# Patient Record
Sex: FEMALE | NOT HISPANIC OR LATINO | URBAN - METROPOLITAN AREA
[De-identification: names, ages, dates, MRNs, and addresses within clinical notes are randomized per-mention and may not be internally consistent; named-entity substitution may affect disease eponyms.]

---

## 2021-11-01 ENCOUNTER — APPOINTMENT (RX ONLY)
Dept: URBAN - METROPOLITAN AREA CLINIC 349 | Facility: CLINIC | Age: 36
Setting detail: DERMATOLOGY
End: 2021-11-01

## 2021-11-01 DIAGNOSIS — L30.1 DYSHIDROSIS [POMPHOLYX]: ICD-10-CM

## 2021-11-01 PROCEDURE — ? COUNSELING

## 2021-11-01 PROCEDURE — ? OTHER

## 2021-11-01 PROCEDURE — ? PRESCRIPTION

## 2021-11-01 PROCEDURE — 96372 THER/PROPH/DIAG INJ SC/IM: CPT

## 2021-11-01 PROCEDURE — ? PRESCRIPTION MEDICATION MANAGEMENT

## 2021-11-01 PROCEDURE — ? INTRAMUSCULAR KENALOG

## 2021-11-01 RX ORDER — TACROLIMUS 1 MG/G
OINTMENT TOPICAL BID
Qty: 30 | Refills: 3 | Status: ERX | COMMUNITY
Start: 2021-11-01

## 2021-11-01 RX ORDER — DUPILUMAB 300 MG/2ML
INJECTION, SOLUTION SUBCUTANEOUS
Qty: 1 | Refills: 3 | Status: ERX | COMMUNITY
Start: 2021-11-01

## 2021-11-01 RX ORDER — CEPHALEXIN 500 MG/1
TABLET ORAL
Qty: 14 | Refills: 0 | Status: ERX | COMMUNITY
Start: 2021-11-01

## 2021-11-01 RX ADMIN — CEPHALEXIN: 500 TABLET ORAL at 00:00

## 2021-11-01 RX ADMIN — TACROLIMUS: 1 OINTMENT TOPICAL at 00:00

## 2021-11-01 RX ADMIN — DUPILUMAB: 300 INJECTION, SOLUTION SUBCUTANEOUS at 00:00

## 2021-11-01 ASSESSMENT — LOCATION DETAILED DESCRIPTION DERM
LOCATION DETAILED: RIGHT RADIAL DORSAL HAND
LOCATION DETAILED: LEFT DORSAL MIDDLE METACARPOPHALANGEAL JOINT
LOCATION DETAILED: RIGHT BUTTOCK
LOCATION DETAILED: RIGHT DORSAL MIDDLE FINGER METACARPOPHALANGEAL JOINT
LOCATION DETAILED: 2ND WEB SPACE LEFT HAND

## 2021-11-01 ASSESSMENT — LOCATION SIMPLE DESCRIPTION DERM
LOCATION SIMPLE: RIGHT HAND
LOCATION SIMPLE: RIGHT BUTTOCK
LOCATION SIMPLE: LEFT HAND

## 2021-11-01 ASSESSMENT — LOCATION ZONE DERM
LOCATION ZONE: HAND
LOCATION ZONE: TRUNK

## 2021-11-01 NOTE — PROCEDURE: INTRAMUSCULAR KENALOG
Kenalog Preparation: kenalog
Add Option For Additional Mediation: No
Consent: The risks of atrophy were reviewed with the patient.
Concentration (Mg/Ml) Of Additional Medication: 2.5
Administered By (Optional): Margarette Celeste LPN
Concentration (Mg/Ml): 40.0
Total Volume (Ccs): 2
Detail Level: Detailed
0 = independent

## 2021-11-01 NOTE — PROCEDURE: OTHER
Detail Level: Zone
Render Risk Assessment In Note?: yes
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
Note Text (......Xxx Chief Complaint.): This diagnosis correlates with the

## 2021-11-01 NOTE — PROCEDURE: PRESCRIPTION MEDICATION MANAGEMENT
Initiate Treatment: Protopic
Plan: Will,try and get Dupixent
Detail Level: Zone
Continue Regimen: Triamcinolone
Render In Strict Bullet Format?: No

## 2021-11-08 ENCOUNTER — RX ONLY (OUTPATIENT)
Age: 36
Setting detail: RX ONLY
End: 2021-11-08

## 2021-11-08 RX ORDER — DUPILUMAB 300 MG/2ML
INJECTION, SOLUTION SUBCUTANEOUS
Qty: 1 | Refills: 3 | Status: ERX

## 2022-01-24 ENCOUNTER — APPOINTMENT (RX ONLY)
Dept: URBAN - METROPOLITAN AREA CLINIC 349 | Facility: CLINIC | Age: 37
Setting detail: DERMATOLOGY
End: 2022-01-24

## 2022-01-24 DIAGNOSIS — L30.1 DYSHIDROSIS [POMPHOLYX]: ICD-10-CM | Status: IMPROVED

## 2022-01-24 PROCEDURE — ? COUNSELING

## 2022-01-24 PROCEDURE — ? PRESCRIPTION

## 2022-01-24 PROCEDURE — 99214 OFFICE O/P EST MOD 30 MIN: CPT

## 2022-01-24 PROCEDURE — ? OTHER

## 2022-01-24 PROCEDURE — ? PRESCRIPTION MEDICATION MANAGEMENT

## 2022-01-24 RX ORDER — DUPILUMAB 300 MG/2ML
INJECTION, SOLUTION SUBCUTANEOUS
Qty: 2 | Refills: 3 | Status: ERX

## 2022-01-24 ASSESSMENT — LOCATION DETAILED DESCRIPTION DERM
LOCATION DETAILED: 2ND WEB SPACE LEFT HAND
LOCATION DETAILED: RIGHT DORSAL MIDDLE FINGER METACARPOPHALANGEAL JOINT
LOCATION DETAILED: LEFT DORSAL MIDDLE METACARPOPHALANGEAL JOINT
LOCATION DETAILED: RIGHT RADIAL DORSAL HAND

## 2022-01-24 ASSESSMENT — LOCATION ZONE DERM: LOCATION ZONE: HAND

## 2022-01-24 ASSESSMENT — LOCATION SIMPLE DESCRIPTION DERM
LOCATION SIMPLE: RIGHT HAND
LOCATION SIMPLE: LEFT HAND

## 2022-01-24 NOTE — PROCEDURE: PRESCRIPTION MEDICATION MANAGEMENT
Render In Strict Bullet Format?: No
Continue Regimen: Triamcinolone or pro topic prn\\nDupixent
Samples Given: Dupixent
Detail Level: Zone

## 2022-01-24 NOTE — PROCEDURE: OTHER
Render Risk Assessment In Note?: yes
Other (Free Text): IGA 1
Detail Level: Zone
Note Text (......Xxx Chief Complaint.): This diagnosis correlates with the

## 2022-02-10 RX ORDER — DUPILUMAB 300 MG/2ML
INJECTION, SOLUTION SUBCUTANEOUS
Qty: 2 | Refills: 3 | Status: ERX

## 2022-03-03 ENCOUNTER — APPOINTMENT (RX ONLY)
Dept: URBAN - METROPOLITAN AREA CLINIC 329 | Facility: CLINIC | Age: 37
Setting detail: DERMATOLOGY
End: 2022-03-03

## 2022-03-03 DIAGNOSIS — L30.1 DYSHIDROSIS [POMPHOLYX]: ICD-10-CM | Status: INADEQUATELY CONTROLLED

## 2022-03-03 DIAGNOSIS — L20.89 OTHER ATOPIC DERMATITIS: ICD-10-CM | Status: INADEQUATELY CONTROLLED

## 2022-03-03 PROCEDURE — ? ADDITIONAL NOTES

## 2022-03-03 PROCEDURE — ? OTHER

## 2022-03-03 PROCEDURE — ? PRESCRIPTION MEDICATION MANAGEMENT

## 2022-03-03 PROCEDURE — 99214 OFFICE O/P EST MOD 30 MIN: CPT

## 2022-03-03 PROCEDURE — ? PRESCRIPTION

## 2022-03-03 PROCEDURE — ? COUNSELING

## 2022-03-03 RX ORDER — RUXOLITINIB 15 MG/G
CREAM TOPICAL
Qty: 60 | Refills: 3 | Status: ERX | COMMUNITY
Start: 2022-03-03

## 2022-03-03 RX ORDER — DUPILUMAB 300 MG/2ML
INJECTION, SOLUTION SUBCUTANEOUS
Qty: 2 | Refills: 5 | Status: ERX | COMMUNITY
Start: 2022-03-03

## 2022-03-03 RX ADMIN — RUXOLITINIB: 15 CREAM TOPICAL at 00:00

## 2022-03-03 RX ADMIN — DUPILUMAB: 300 INJECTION, SOLUTION SUBCUTANEOUS at 00:00

## 2022-03-03 ASSESSMENT — LOCATION DETAILED DESCRIPTION DERM
LOCATION DETAILED: RIGHT RADIAL DORSAL HAND
LOCATION DETAILED: RIGHT DORSAL MIDDLE FINGER METACARPOPHALANGEAL JOINT
LOCATION DETAILED: RIGHT DORSAL MIDDLE FINGER METACARPOPHALANGEAL JOINT
LOCATION DETAILED: LEFT DORSAL MIDDLE METACARPOPHALANGEAL JOINT
LOCATION DETAILED: 2ND WEB SPACE LEFT HAND

## 2022-03-03 ASSESSMENT — BSA ECZEMA: % BODY COVERED IN ECZEMA: 16

## 2022-03-03 ASSESSMENT — ITCH NUMERIC RATING SCALE: HOW SEVERE IS YOUR ITCHING?: 2

## 2022-03-03 ASSESSMENT — LOCATION SIMPLE DESCRIPTION DERM
LOCATION SIMPLE: RIGHT HAND
LOCATION SIMPLE: RIGHT HAND
LOCATION SIMPLE: LEFT HAND

## 2022-03-03 ASSESSMENT — LOCATION ZONE DERM
LOCATION ZONE: HAND
LOCATION ZONE: HAND

## 2022-03-03 NOTE — PROCEDURE: PRESCRIPTION MEDICATION MANAGEMENT
Render In Strict Bullet Format?: No
Continue Regimen: Triamcinolone or pro topic prn
Detail Level: Zone
Detail Level: Detailed
Initiate Treatment: Pt is being treated with Opzelura to helps w/ flare ups while on dupixent

## 2022-03-16 ENCOUNTER — RX ONLY (OUTPATIENT)
Age: 37
Setting detail: RX ONLY
End: 2022-03-16

## 2022-03-16 RX ORDER — DUPILUMAB 300 MG/2ML
INJECTION, SOLUTION SUBCUTANEOUS
Qty: 2 | Refills: 3 | Status: ERX

## 2022-03-16 RX ORDER — DUPILUMAB 300 MG/2ML
INJECTION, SOLUTION SUBCUTANEOUS
Qty: 2 | Refills: 5 | Status: ERX

## 2022-03-19 PROBLEM — E66.01 SEVERE OBESITY (HCC): Status: ACTIVE | Noted: 2020-11-24

## 2022-06-09 ENCOUNTER — TELEPHONE (OUTPATIENT)
Dept: OBGYN CLINIC | Age: 37
End: 2022-06-09

## 2022-06-09 RX ORDER — METRONIDAZOLE 7.5 MG/G
1 GEL VAGINAL NIGHTLY
Qty: 5 EACH | Refills: 0 | Status: SHIPPED | OUTPATIENT
Start: 2022-06-09 | End: 2022-06-14

## 2022-06-09 NOTE — TELEPHONE ENCOUNTER
Pt called regarding Nuswab results from 5/18/22. Pt states on voicemail that this is her third time calling this week for results but has not received a call back. No prior documentation of any calls. Results have not yet been reviewed by . Results reviewed by Nery Guerrero as Dayami Jacobs is out of the office until next week. Per  to send in Metrogel x 5 nights. Attempted to return call x 2. No answer. LM instructing pt to return call. Will send rx once pt is notified of results and verify pharmacy.

## 2022-06-09 NOTE — TELEPHONE ENCOUNTER
Pt returned call. Results and recommendations reviewed. Pt voiced understanding. Rx sent to verified pharmacy.

## 2022-06-20 ENCOUNTER — HOSPITAL ENCOUNTER (OUTPATIENT)
Dept: LAB | Age: 37
Discharge: HOME | End: 2022-06-23
Payer: OTHER GOVERNMENT

## 2022-06-20 ENCOUNTER — OFFICE VISIT (OUTPATIENT)
Dept: OBGYN CLINIC | Age: 37
End: 2022-06-20
Payer: OTHER GOVERNMENT

## 2022-06-20 VITALS — WEIGHT: 204 LBS | BODY MASS INDEX: 34.83 KG/M2 | HEIGHT: 64 IN

## 2022-06-20 DIAGNOSIS — N93.0 POSTCOITAL BLEEDING: ICD-10-CM

## 2022-06-20 DIAGNOSIS — N93.8 DUB (DYSFUNCTIONAL UTERINE BLEEDING): Primary | ICD-10-CM

## 2022-06-20 DIAGNOSIS — N84.1 ENDOCERVICAL POLYP: ICD-10-CM

## 2022-06-20 PROCEDURE — 57500 BIOPSY OF CERVIX: CPT | Performed by: OBSTETRICS & GYNECOLOGY

## 2022-06-20 PROCEDURE — 99204 OFFICE O/P NEW MOD 45 MIN: CPT | Performed by: OBSTETRICS & GYNECOLOGY

## 2022-06-20 PROCEDURE — 88305 TISSUE EXAM BY PATHOLOGIST: CPT

## 2022-06-20 NOTE — PROGRESS NOTES
Everett Cassidy  is a 29 y.o. female, No obstetric history on file., Patient's last menstrual period was 11/10/2021 (approximate). , new patient who is seen for a second opinion about her abnormal uterine bleeding. Would like to discuss proceeding with hysterectomy. November irregular bleeding started and continued up until the end of April; beginning of May. In between this she consulted with OB/GYN in January about this irregular bleeding. She was prescribed OCP's to help control bleeding. She took the OCP's X4 months however while on the pills her irregular bleeding pattern continued. She then self discontinued the pills. Now she is just having irregular spotting and discharge that she describes as a [place] brown/ yellow\" color with a foul odor. Denies vaginal itching. Prior to the irregular bleeding that started in November she states that her periods were overall normal --- the beginning of each month, lasting 5-7 days, first few days heavy bleeding then light bleeding. Denies dysmenorrhea. Denies post coital bleeding. No known history of uterine fibroids, PCOS, ovarian cyst or endometriosis. Last pap smear was performed on 4/28/21: ASCUS, HPV Negative. Endometrial Biopsy from 1/20/22 was benign. Labs drawn on 1/20/22: [address] (6.7), Estradiol (36.2), Luteinizing hormones (5.7), CBC (wnl), TSH (2.090), T4 (1.13)  Hemoglobin on 5/18/22 (10.6)  Nuswab on 5/18/22 --- BV --- Treated with Metrogel. Prior to November very regular  On pills constant spotting  Also now post coital.    Gyn ultrasound ordered on 2/3/22 by Lakia Solano D:  · Enlarged, heterogeneous, anteverted uterus. · Endometrium= 6.97 mm  · ROV is enlarged, contains one simple cyst 2.9 x 2.2 x 2.2 cm and one complex cyst with thin septation. · LOV visualized TA only due to bowel gas, appears within normal limits. · Trace amount of free fluid in the posterior CDS. Just medial to right ovary.       HISTORY:  Sexual History:  has sex with males  Contraception:  Tubal ligation   Current Outpatient Medications on File Prior to Visit   Medication Sig Dispense Refill    traZODone (DESYREL) 100 MG tablet Take 200 mg by mouth       No current facility-administered medications on file prior to visit. ROS:  @MARLYN Woodruff Ashley  has a past medical history of Contact dermatitis and eczema due to cause and Depression. .    Previous surgeries include  has a past surgical history that includes lap,tubal cautery; heent; gyn; and Cholecystectomy. .    Her current meds are   Current Outpatient Medications:     traZODone (DESYREL) 100 MG tablet, Take 200 mg by mouth, Disp: , Rfl:      Family history is significant for family history includes Diabetes in her maternal grandmother; Endometriosis in her maternal aunt and maternal grandmother; Hypertension in her father and maternal grandmother; No Known Problems in her mother. .       PHYSICAL EXAM:  Height 5' 4\" (1.626 m), weight 204 lb (92.5 kg), last menstrual period 11/10/2021, not currently breastfeeding. On speculum exam she is noted to have a large approximately 2 cm endocervical polyp coming through the cervix. On bimanual exam the uterus is normal shape and contour and freely mobile. ASSESSMENT:  Encounter Diagnoses   Name Primary?  Endocervical polyp     DUB (dysfunctional uterine bleeding) Yes    Postcoital bleeding        PLAN:  She is having continued episodes of abnormal bleeding and postcoital bleeding which significantly interfering with her normal activities. This is significantly interfering with her life. She very much wishes more definitive therapy. Discussed possibility of further medical therapy possible IUD possible endometrial ablation or proceeding on with hysterectomy she very much wished to proceed on with hysterectomy.   Discussed laparoscopic hysterectomy robotic versus traditional.  We will plan on leaving her ovaries so she would not have changes hormonally and she wishes to proceed on with this route. Did discuss with her I think that she removed this endometrial polyp as I think it is involved especially in her postcoital bleeding. She has recently had an exam which was not noted to have this polyp and therefore this may have delivered since her last visit and recommend removal.  Consent was obtained. After consent was obtained the endometrial polyp was grasped with a ring forcep and twisted at the base popping off. Small amount of silver nitrate was used to help with hemostasis which was minimal.  Patient tolerated procedure well. We will send this off to pathology.   Orders Placed This Encounter   Procedures    [facility] Surgical Pathology     Standing Status:   Future     Number of Occurrences:   1     Standing Expiration Date:   6/20/2023

## 2022-06-21 ENCOUNTER — TELEPHONE (OUTPATIENT)
Dept: OBGYN CLINIC | Age: 37
End: 2022-06-21

## 2022-06-29 NOTE — PROGRESS NOTES
Patient verified name and     Order for consent NOT found in EHR; patient verified. Type 2 surgery    Labs per surgeon: No orders received. Labs per anesthesia protocol: Hgb, T&S DOS; orders signed and held in EHR   EKG: None. Patient informed of GYN class on 22 at which time labs will be drawn. Patient will also receive all patient education and hospital approved surgical skin cleanser to use per hospital policy. Patient instructed to hold all vitamins 7 days prior to surgery and NSAIDS 5 days prior to surgery, patient verbalized understanding. Patient instructed to continue previous medications as prescribed prior to surgery and to take the following medications the day of surgery according to anesthesia guidelines with a small sip of water: NONE. Patient answered medical/surgical history questions at their best of ability. All prior to admission medications documented in [facility].

## 2022-06-29 NOTE — PROGRESS NOTES
PLEASE CONTINUE TAKING ALL PRESCRIPTION MEDICATIONS UP TO THE DAY OF SURGERY UNLESS OTHERWISE DIRECTED BELOW. DISCONTINUE all vitamins, herbals and supplements 7 days prior to surgery. DISCONTINUE Non-Steriodal Anti-Inflammatory (NSAIDS) such as Advil, Ibuprofen, and Aleve 5 days prior to surgery. Home Medications to HOLD       All vitamins, supplements, and herbals stop 7 days prior to surgery. All NSAIDs such as Advil, Aleve, Ibuprofen, Diclofenac, Naproxen, etc. Stop 5 days prior to surgery. *IT IS OK TO TAKE TYLENOL*      Home Medications to take  the day of surgery   NONE          Comments   *The day before surgery, 7/13/22, take Acetaminophen (Tylenol) 1000mg in the morning and again at bedtime. Can substitute 650mg Tylenol*                Please do not bring home medications with you on the day of surgery unless otherwise directed by your nurse. If you are instructed to bring home medications, please give them to your nurse as they will be administered by the nursing staff. If you have any questions, please call [address] [place] [address] [phone] or [facility] [phone]. The above note, with detailed instructions, will be given to patient and reviewed with patient at GYN/ERAS class on 7/6/22.

## 2022-07-05 NOTE — PROGRESS NOTES
Preop Visit    Ms. Greg Art presents for a preop visit. She is scheduled for a Hysterectomy Abdominal Laparoscopic Robotic on 7/14/22. Her history, meds, and allergies were reviewed. The procedure was reviewed in detail as well as the risks of bleeding, infection, DVT and potential surgical complications involving the bladder, ureters, colon or intestines. Also the alternatives,  benefits, recovery and follow-up. Prevention of SSI discussed as indicated. All of her questions were answered. Exam:  Lungs CTAB  Heart RRR    See the hospital H&P as well as prior chart for details.

## 2022-07-06 ENCOUNTER — HOSPITAL ENCOUNTER (OUTPATIENT)
Dept: SURGERY | Age: 37
Discharge: HOME | End: 2022-07-09
Payer: OTHER GOVERNMENT

## 2022-07-06 LAB — HGB BLD-MCNC: 12.2 G/DL (ref 11.7–15.4)

## 2022-07-06 PROCEDURE — 85018 HEMOGLOBIN: CPT

## 2022-07-06 PROCEDURE — 36415 COLL VENOUS BLD VENIPUNCTURE: CPT

## 2022-07-06 NOTE — NURSE NAVIGATOR
Patient attended ERAS/ GYN surgery orientation class today. Detailed instruction book regarding GYN surgery was provided at start of class. Class content included pre-operative instructions for surgery in the week prior to and day before surgery. Packet including Hibiclens and printed instructions on bathing was provided to patient. Detailed and printed diet instruction and presurgical drinks were also given to patient  in accordance with ERAS protocol. Detailed information was given regarding arriving at the hospital and instructions for the patient's day of surgery. Discussed recovery from surgery, hospital stay, pain management, and discharge. Reviewed recovery at home including pelvic rest, driving and activity restrictions, issues requiring call to physician etc. Jacquie Arnaldo all questions in detail. Patient voices understanding of all.

## 2022-07-07 ENCOUNTER — OFFICE VISIT (OUTPATIENT)
Dept: OBGYN CLINIC | Age: 37
End: 2022-07-07

## 2022-07-07 VITALS
WEIGHT: 206.6 LBS | BODY MASS INDEX: 36.61 KG/M2 | HEIGHT: 63 IN | SYSTOLIC BLOOD PRESSURE: 130 MMHG | DIASTOLIC BLOOD PRESSURE: 82 MMHG

## 2022-07-07 DIAGNOSIS — Z01.818 PRE-OP EXAMINATION: Primary | ICD-10-CM

## 2022-07-08 NOTE — H&P
Gynecology History and Physical    Name: Grayson Michele MRN: [id number] SSN: [id number]    YOB: 1985  Age: 29 y.o. Sex: female       Subjective:      Chief complaint:  Abnormal uterine bleeding    Karen Bobo is a 29 y.o.  female with a history of abnormal uterine bleeding. Gyn ultrasound ordered on 2/3/22 by Philly CHINO:  ? Enlarged, heterogeneous, anteverted uterus. ? Endometrium= 6.97 mm  ? ROV is enlarged, contains one simple cyst 2.9 x 2.2 x 2.2 cm and one complex cyst with thin septation. ? LOV visualized TA only due to bowel gas, appears within normal limits. ? Trace amount of free fluid in the posterior CDS. Just medial to right ovary. She is admitted for Procedure(s) (LRB):  ERAS HYSTERECTOMY ABDOMINAL LAPAROSCOPIC ROBOTIC (N/A). The current method of family planning is tubal ligation.     OB History        3    Para   3    Term   3            AB        Living   3       SAB        IAB        Ectopic        Molar        Multiple        Live Births   3              Past Medical History:   Diagnosis Date    Contact dermatitis and eczema due to cause     Depression     PONV (postoperative nausea and vomiting)      Past Surgical History:   Procedure Laterality Date    CHOLECYSTECTOMY      GYN      HEENT      LAP,TUBAL CAUTERY       Social History     Occupational History    Not on file   Tobacco Use    Smoking status: Former Smoker     Quit date: 2017     Years since quittin.1    Smokeless tobacco: Never Used   Substance and Sexual Activity    Alcohol use: Not Currently    Drug use: Not Currently     Types: Prescription    Sexual activity: Yes     Partners: Male     Birth control/protection: Surgical     Comment: tubal ligation     Family History   Problem Relation Age of Onset    Hypertension Father     No Known Problems Mother     Hypertension Maternal Grandmother     Endometriosis Maternal Grandmother     Endometriosis Maternal

## 2022-07-12 ENCOUNTER — APPOINTMENT (RX ONLY)
Dept: URBAN - METROPOLITAN AREA CLINIC 329 | Facility: CLINIC | Age: 37
Setting detail: DERMATOLOGY
End: 2022-07-12

## 2022-07-12 DIAGNOSIS — L20.89 OTHER ATOPIC DERMATITIS: ICD-10-CM | Status: IMPROVED

## 2022-07-12 PROCEDURE — ? PRESCRIPTION MEDICATION MANAGEMENT

## 2022-07-12 PROCEDURE — ? COUNSELING

## 2022-07-12 PROCEDURE — 99214 OFFICE O/P EST MOD 30 MIN: CPT

## 2022-07-12 PROCEDURE — ? PRESCRIPTION

## 2022-07-12 RX ORDER — DUPILUMAB 300 MG/2ML
INJECTION, SOLUTION SUBCUTANEOUS
Qty: 2 | Refills: 6 | Status: ERX

## 2022-07-12 ASSESSMENT — ITCH NUMERIC RATING SCALE: HOW SEVERE IS YOUR ITCHING?: 1

## 2022-07-12 ASSESSMENT — LOCATION SIMPLE DESCRIPTION DERM
LOCATION SIMPLE: RIGHT HAND
LOCATION SIMPLE: RIGHT HAND
LOCATION SIMPLE: LEFT HAND

## 2022-07-12 ASSESSMENT — LOCATION DETAILED DESCRIPTION DERM
LOCATION DETAILED: RIGHT RADIAL DORSAL HAND
LOCATION DETAILED: RIGHT DORSAL MIDDLE FINGER METACARPOPHALANGEAL JOINT
LOCATION DETAILED: LEFT ULNAR DORSAL HAND
LOCATION DETAILED: RIGHT DORSAL MIDDLE FINGER METACARPOPHALANGEAL JOINT

## 2022-07-12 ASSESSMENT — LOCATION ZONE DERM
LOCATION ZONE: HAND
LOCATION ZONE: HAND

## 2022-07-12 ASSESSMENT — SEVERITY ASSESSMENT 2020: SEVERITY 2020: ALMOST CLEAR

## 2022-07-12 ASSESSMENT — BSA ECZEMA: % BODY COVERED IN ECZEMA: 5

## 2022-07-12 NOTE — PROCEDURE: PRESCRIPTION MEDICATION MANAGEMENT
Plan: Pt states that she has  so she cannot get Opzelura\\nPt is happy with the Dupixent only
Detail Level: Detailed
Continue Regimen: Dupixent \\nMoisturizing regularly
Render In Strict Bullet Format?: No

## 2022-07-13 ENCOUNTER — ANESTHESIA EVENT (OUTPATIENT)
Dept: SURGERY | Age: 37
End: 2022-07-13
Payer: OTHER GOVERNMENT

## 2022-07-14 ENCOUNTER — HOSPITAL ENCOUNTER (OUTPATIENT)
Age: 37
Discharge: HOME | End: 2022-07-14
Attending: OBSTETRICS & GYNECOLOGY | Admitting: OBSTETRICS & GYNECOLOGY
Payer: OTHER GOVERNMENT

## 2022-07-14 ENCOUNTER — ANESTHESIA (OUTPATIENT)
Dept: SURGERY | Age: 37
End: 2022-07-14
Payer: OTHER GOVERNMENT

## 2022-07-14 VITALS
RESPIRATION RATE: 17 BRPM | BODY MASS INDEX: 36.32 KG/M2 | OXYGEN SATURATION: 97 % | DIASTOLIC BLOOD PRESSURE: 75 MMHG | HEART RATE: 91 BPM | HEIGHT: 63 IN | TEMPERATURE: 98.6 F | WEIGHT: 205 LBS | SYSTOLIC BLOOD PRESSURE: 140 MMHG

## 2022-07-14 DIAGNOSIS — N93.9 ABNORMAL UTERINE BLEEDING (AUB): Primary | ICD-10-CM

## 2022-07-14 LAB
ABO + RH BLD: NORMAL
BLOOD GROUP ANTIBODIES SERPL: NORMAL
HCG UR QL: NEGATIVE
SPECIMEN EXP DATE BLD: NORMAL

## 2022-07-14 PROCEDURE — 6370000000 HC RX 637 (ALT 250 FOR IP): Performed by: ANESTHESIOLOGY

## 2022-07-14 PROCEDURE — 7100000001 HC PACU RECOVERY - ADDTL 15 MIN: Performed by: OBSTETRICS & GYNECOLOGY

## 2022-07-14 PROCEDURE — 86900 BLOOD TYPING SEROLOGIC ABO: CPT

## 2022-07-14 PROCEDURE — 3600000009 HC SURGERY ROBOT BASE: Performed by: OBSTETRICS & GYNECOLOGY

## 2022-07-14 PROCEDURE — 88307 TISSUE EXAM BY PATHOLOGIST: CPT

## 2022-07-14 PROCEDURE — 6360000002 HC RX W HCPCS: Performed by: ANESTHESIOLOGY

## 2022-07-14 PROCEDURE — S2900 ROBOTIC SURGICAL SYSTEM: HCPCS | Performed by: OBSTETRICS & GYNECOLOGY

## 2022-07-14 PROCEDURE — 2709999900 HC NON-CHARGEABLE SUPPLY: Performed by: OBSTETRICS & GYNECOLOGY

## 2022-07-14 PROCEDURE — 3700000001 HC ADD 15 MINUTES (ANESTHESIA): Performed by: OBSTETRICS & GYNECOLOGY

## 2022-07-14 PROCEDURE — 2580000003 HC RX 258: Performed by: ANESTHESIOLOGY

## 2022-07-14 PROCEDURE — 2500000003 HC RX 250 WO HCPCS: Performed by: OBSTETRICS & GYNECOLOGY

## 2022-07-14 PROCEDURE — 6360000002 HC RX W HCPCS: Performed by: OBSTETRICS & GYNECOLOGY

## 2022-07-14 PROCEDURE — 7100000000 HC PACU RECOVERY - FIRST 15 MIN: Performed by: OBSTETRICS & GYNECOLOGY

## 2022-07-14 PROCEDURE — 3700000000 HC ANESTHESIA ATTENDED CARE: Performed by: OBSTETRICS & GYNECOLOGY

## 2022-07-14 PROCEDURE — 3600000019 HC SURGERY ROBOT ADDTL 15MIN: Performed by: OBSTETRICS & GYNECOLOGY

## 2022-07-14 PROCEDURE — 2500000003 HC RX 250 WO HCPCS: Performed by: ANESTHESIOLOGY

## 2022-07-14 PROCEDURE — 6360000002 HC RX W HCPCS: Performed by: NURSE ANESTHETIST, CERTIFIED REGISTERED

## 2022-07-14 PROCEDURE — 2720000010 HC SURG SUPPLY STERILE: Performed by: OBSTETRICS & GYNECOLOGY

## 2022-07-14 PROCEDURE — 2580000003 HC RX 258: Performed by: OBSTETRICS & GYNECOLOGY

## 2022-07-14 PROCEDURE — 2500000003 HC RX 250 WO HCPCS: Performed by: NURSE ANESTHETIST, CERTIFIED REGISTERED

## 2022-07-14 PROCEDURE — 58571 TLH W/T/O 250 G OR LESS: CPT | Performed by: OBSTETRICS & GYNECOLOGY

## 2022-07-14 PROCEDURE — 81025 URINE PREGNANCY TEST: CPT

## 2022-07-14 RX ORDER — FENTANYL CITRATE 50 UG/ML
100 INJECTION, SOLUTION INTRAMUSCULAR; INTRAVENOUS
Status: DISCONTINUED | OUTPATIENT
Start: 2022-07-14 | End: 2022-07-14 | Stop reason: HOSPADM

## 2022-07-14 RX ORDER — FENTANYL CITRATE 50 UG/ML
INJECTION, SOLUTION INTRAMUSCULAR; INTRAVENOUS PRN
Status: DISCONTINUED | OUTPATIENT
Start: 2022-07-14 | End: 2022-07-14 | Stop reason: SDUPTHER

## 2022-07-14 RX ORDER — PROPOFOL 10 MG/ML
INJECTION, EMULSION INTRAVENOUS PRN
Status: DISCONTINUED | OUTPATIENT
Start: 2022-07-14 | End: 2022-07-14 | Stop reason: SDUPTHER

## 2022-07-14 RX ORDER — OXYCODONE HYDROCHLORIDE 5 MG/1
5 TABLET ORAL
Status: COMPLETED | OUTPATIENT
Start: 2022-07-14 | End: 2022-07-14

## 2022-07-14 RX ORDER — GLUCAGON 1 MG/ML
1 KIT INJECTION PRN
Status: DISCONTINUED | OUTPATIENT
Start: 2022-07-14 | End: 2022-07-14 | Stop reason: HOSPADM

## 2022-07-14 RX ORDER — LIDOCAINE HYDROCHLORIDE 10 MG/ML
1 INJECTION, SOLUTION INFILTRATION; PERINEURAL
Status: COMPLETED | OUTPATIENT
Start: 2022-07-14 | End: 2022-07-14

## 2022-07-14 RX ORDER — SODIUM CHLORIDE 0.9 % (FLUSH) 0.9 %
5-40 SYRINGE (ML) INJECTION PRN
Status: DISCONTINUED | OUTPATIENT
Start: 2022-07-14 | End: 2022-07-14 | Stop reason: HOSPADM

## 2022-07-14 RX ORDER — PROCHLORPERAZINE EDISYLATE 5 MG/ML
5 INJECTION INTRAMUSCULAR; INTRAVENOUS
Status: COMPLETED | OUTPATIENT
Start: 2022-07-14 | End: 2022-07-14

## 2022-07-14 RX ORDER — SODIUM CHLORIDE 0.9 % (FLUSH) 0.9 %
5-40 SYRINGE (ML) INJECTION EVERY 12 HOURS SCHEDULED
Status: DISCONTINUED | OUTPATIENT
Start: 2022-07-14 | End: 2022-07-14 | Stop reason: HOSPADM

## 2022-07-14 RX ORDER — ONDANSETRON 2 MG/ML
4 INJECTION INTRAMUSCULAR; INTRAVENOUS EVERY 6 HOURS PRN
Status: DISCONTINUED | OUTPATIENT
Start: 2022-07-14 | End: 2022-07-14 | Stop reason: HOSPADM

## 2022-07-14 RX ORDER — SODIUM CHLORIDE, SODIUM LACTATE, POTASSIUM CHLORIDE, CALCIUM CHLORIDE 600; 310; 30; 20 MG/100ML; MG/100ML; MG/100ML; MG/100ML
INJECTION, SOLUTION INTRAVENOUS CONTINUOUS
Status: DISCONTINUED | OUTPATIENT
Start: 2022-07-14 | End: 2022-07-14 | Stop reason: HOSPADM

## 2022-07-14 RX ORDER — EPHEDRINE SULFATE/0.9% NACL/PF 50 MG/5 ML
SYRINGE (ML) INTRAVENOUS PRN
Status: DISCONTINUED | OUTPATIENT
Start: 2022-07-14 | End: 2022-07-14 | Stop reason: SDUPTHER

## 2022-07-14 RX ORDER — OXYCODONE HYDROCHLORIDE 5 MG/1
5 TABLET ORAL EVERY 6 HOURS PRN
Qty: 12 TABLET | Refills: 0 | Status: SHIPPED | OUTPATIENT
Start: 2022-07-14 | End: 2022-07-19

## 2022-07-14 RX ORDER — KETAMINE HCL IN NACL, ISO-OSM 20 MG/2 ML
SYRINGE (ML) INJECTION PRN
Status: DISCONTINUED | OUTPATIENT
Start: 2022-07-14 | End: 2022-07-14 | Stop reason: SDUPTHER

## 2022-07-14 RX ORDER — BUPIVACAINE HYDROCHLORIDE 5 MG/ML
INJECTION, SOLUTION EPIDURAL; INTRACAUDAL PRN
Status: DISCONTINUED | OUTPATIENT
Start: 2022-07-14 | End: 2022-07-14 | Stop reason: ALTCHOICE

## 2022-07-14 RX ORDER — APREPITANT 40 MG/1
40 CAPSULE ORAL ONCE
Status: COMPLETED | OUTPATIENT
Start: 2022-07-14 | End: 2022-07-14

## 2022-07-14 RX ORDER — ONDANSETRON 4 MG/1
4 TABLET, ORALLY DISINTEGRATING ORAL EVERY 8 HOURS PRN
Status: DISCONTINUED | OUTPATIENT
Start: 2022-07-14 | End: 2022-07-14 | Stop reason: HOSPADM

## 2022-07-14 RX ORDER — SODIUM CHLORIDE 9 MG/ML
INJECTION, SOLUTION INTRAVENOUS PRN
Status: DISCONTINUED | OUTPATIENT
Start: 2022-07-14 | End: 2022-07-14 | Stop reason: HOSPADM

## 2022-07-14 RX ORDER — HYDROMORPHONE HYDROCHLORIDE 1 MG/ML
0.5 INJECTION, SOLUTION INTRAMUSCULAR; INTRAVENOUS; SUBCUTANEOUS EVERY 10 MIN PRN
Status: DISCONTINUED | OUTPATIENT
Start: 2022-07-14 | End: 2022-07-14 | Stop reason: HOSPADM

## 2022-07-14 RX ORDER — GLYCOPYRROLATE 0.2 MG/ML
INJECTION INTRAMUSCULAR; INTRAVENOUS PRN
Status: DISCONTINUED | OUTPATIENT
Start: 2022-07-14 | End: 2022-07-14 | Stop reason: SDUPTHER

## 2022-07-14 RX ORDER — KETOROLAC TROMETHAMINE 30 MG/ML
INJECTION, SOLUTION INTRAMUSCULAR; INTRAVENOUS PRN
Status: DISCONTINUED | OUTPATIENT
Start: 2022-07-14 | End: 2022-07-14 | Stop reason: SDUPTHER

## 2022-07-14 RX ORDER — LIDOCAINE HYDROCHLORIDE 20 MG/ML
INJECTION, SOLUTION EPIDURAL; INFILTRATION; INTRACAUDAL; PERINEURAL PRN
Status: DISCONTINUED | OUTPATIENT
Start: 2022-07-14 | End: 2022-07-14 | Stop reason: SDUPTHER

## 2022-07-14 RX ORDER — IBUPROFEN 800 MG/1
800 TABLET ORAL EVERY 6 HOURS PRN
Qty: 40 TABLET | Refills: 3 | Status: SHIPPED | OUTPATIENT
Start: 2022-07-14

## 2022-07-14 RX ORDER — OXYCODONE HYDROCHLORIDE 5 MG/1
10 TABLET ORAL EVERY 4 HOURS PRN
Status: DISCONTINUED | OUTPATIENT
Start: 2022-07-14 | End: 2022-07-14 | Stop reason: HOSPADM

## 2022-07-14 RX ORDER — MIDAZOLAM HYDROCHLORIDE 2 MG/2ML
2 INJECTION, SOLUTION INTRAMUSCULAR; INTRAVENOUS
Status: COMPLETED | OUTPATIENT
Start: 2022-07-14 | End: 2022-07-14

## 2022-07-14 RX ORDER — ACETAMINOPHEN 500 MG
1000 TABLET ORAL ONCE
Status: COMPLETED | OUTPATIENT
Start: 2022-07-14 | End: 2022-07-14

## 2022-07-14 RX ORDER — DIPHENHYDRAMINE HYDROCHLORIDE 50 MG/ML
12.5 INJECTION INTRAMUSCULAR; INTRAVENOUS
Status: DISCONTINUED | OUTPATIENT
Start: 2022-07-14 | End: 2022-07-14 | Stop reason: HOSPADM

## 2022-07-14 RX ORDER — KETOROLAC TROMETHAMINE 30 MG/ML
30 INJECTION, SOLUTION INTRAMUSCULAR; INTRAVENOUS EVERY 6 HOURS
Status: DISCONTINUED | OUTPATIENT
Start: 2022-07-14 | End: 2022-07-14 | Stop reason: HOSPADM

## 2022-07-14 RX ORDER — NEOSTIGMINE METHYLSULFATE 1 MG/ML
INJECTION, SOLUTION INTRAVENOUS PRN
Status: DISCONTINUED | OUTPATIENT
Start: 2022-07-14 | End: 2022-07-14 | Stop reason: SDUPTHER

## 2022-07-14 RX ORDER — DEXTROSE MONOHYDRATE 50 MG/ML
100 INJECTION, SOLUTION INTRAVENOUS PRN
Status: DISCONTINUED | OUTPATIENT
Start: 2022-07-14 | End: 2022-07-14 | Stop reason: HOSPADM

## 2022-07-14 RX ORDER — ACETAMINOPHEN 500 MG
1000 TABLET ORAL EVERY 8 HOURS PRN
Status: DISCONTINUED | OUTPATIENT
Start: 2022-07-14 | End: 2022-07-14 | Stop reason: HOSPADM

## 2022-07-14 RX ORDER — ONDANSETRON 2 MG/ML
INJECTION INTRAMUSCULAR; INTRAVENOUS PRN
Status: DISCONTINUED | OUTPATIENT
Start: 2022-07-14 | End: 2022-07-14 | Stop reason: SDUPTHER

## 2022-07-14 RX ORDER — OXYCODONE HYDROCHLORIDE 5 MG/1
5 TABLET ORAL EVERY 4 HOURS PRN
Status: DISCONTINUED | OUTPATIENT
Start: 2022-07-14 | End: 2022-07-14 | Stop reason: HOSPADM

## 2022-07-14 RX ORDER — ROCURONIUM BROMIDE 10 MG/ML
INJECTION, SOLUTION INTRAVENOUS PRN
Status: DISCONTINUED | OUTPATIENT
Start: 2022-07-14 | End: 2022-07-14 | Stop reason: SDUPTHER

## 2022-07-14 RX ORDER — IBUPROFEN 800 MG/1
800 TABLET ORAL EVERY 8 HOURS
Status: DISCONTINUED | OUTPATIENT
Start: 2022-07-15 | End: 2022-07-14 | Stop reason: HOSPADM

## 2022-07-14 RX ORDER — DEXAMETHASONE SODIUM PHOSPHATE 10 MG/ML
INJECTION INTRAMUSCULAR; INTRAVENOUS PRN
Status: DISCONTINUED | OUTPATIENT
Start: 2022-07-14 | End: 2022-07-14 | Stop reason: SDUPTHER

## 2022-07-14 RX ADMIN — ACETAMINOPHEN 1000 MG: 500 TABLET, FILM COATED ORAL at 09:38

## 2022-07-14 RX ADMIN — SODIUM CHLORIDE, SODIUM LACTATE, POTASSIUM CHLORIDE, AND CALCIUM CHLORIDE: 600; 310; 30; 20 INJECTION, SOLUTION INTRAVENOUS at 14:50

## 2022-07-14 RX ADMIN — OXYCODONE 5 MG: 5 TABLET ORAL at 12:56

## 2022-07-14 RX ADMIN — ROCURONIUM BROMIDE 40 MG: 50 INJECTION, SOLUTION INTRAVENOUS at 10:25

## 2022-07-14 RX ADMIN — APREPITANT 40 MG: 40 CAPSULE ORAL at 09:38

## 2022-07-14 RX ADMIN — LIDOCAINE HYDROCHLORIDE 100 MG: 20 INJECTION, SOLUTION EPIDURAL; INFILTRATION; INTRACAUDAL; PERINEURAL at 10:25

## 2022-07-14 RX ADMIN — MIDAZOLAM 2 MG: 1 INJECTION, SOLUTION INTRAMUSCULAR; INTRAVENOUS at 09:57

## 2022-07-14 RX ADMIN — DEXAMETHASONE SODIUM PHOSPHATE 5 MG: 10 INJECTION INTRAMUSCULAR; INTRAVENOUS at 10:46

## 2022-07-14 RX ADMIN — Medication 2000 MG: at 10:23

## 2022-07-14 RX ADMIN — LIDOCAINE HYDROCHLORIDE 1 ML: 10 INJECTION, SOLUTION INFILTRATION; PERINEURAL at 09:57

## 2022-07-14 RX ADMIN — ROCURONIUM BROMIDE 10 MG: 50 INJECTION, SOLUTION INTRAVENOUS at 11:18

## 2022-07-14 RX ADMIN — KETOROLAC TROMETHAMINE 30 MG: 30 INJECTION, SOLUTION INTRAMUSCULAR; INTRAVENOUS at 15:49

## 2022-07-14 RX ADMIN — KETOROLAC TROMETHAMINE 30 MG: 30 INJECTION, SOLUTION INTRAMUSCULAR; INTRAVENOUS at 12:00

## 2022-07-14 RX ADMIN — GLYCOPYRROLATE 0.4 MG: 0.2 INJECTION, SOLUTION INTRAMUSCULAR; INTRAVENOUS at 12:06

## 2022-07-14 RX ADMIN — HYDROMORPHONE HYDROCHLORIDE 0.5 MG: 1 INJECTION, SOLUTION INTRAMUSCULAR; INTRAVENOUS; SUBCUTANEOUS at 12:41

## 2022-07-14 RX ADMIN — Medication 10 MG: at 10:43

## 2022-07-14 RX ADMIN — Medication 3 MG: at 12:06

## 2022-07-14 RX ADMIN — PROPOFOL 200 MG: 10 INJECTION, EMULSION INTRAVENOUS at 10:25

## 2022-07-14 RX ADMIN — FENTANYL CITRATE 50 MCG: 50 INJECTION INTRAMUSCULAR; INTRAVENOUS at 10:31

## 2022-07-14 RX ADMIN — FENTANYL CITRATE 50 MCG: 50 INJECTION INTRAMUSCULAR; INTRAVENOUS at 10:25

## 2022-07-14 RX ADMIN — SODIUM CHLORIDE, POTASSIUM CHLORIDE, SODIUM LACTATE AND CALCIUM CHLORIDE: 600; 310; 30; 20 INJECTION, SOLUTION INTRAVENOUS at 09:56

## 2022-07-14 RX ADMIN — ONDANSETRON 4 MG: 2 INJECTION INTRAMUSCULAR; INTRAVENOUS at 10:46

## 2022-07-14 RX ADMIN — Medication 20 MG: at 11:31

## 2022-07-14 RX ADMIN — Medication 20 MG: at 10:25

## 2022-07-14 RX ADMIN — PROCHLORPERAZINE EDISYLATE 5 MG: 5 INJECTION INTRAMUSCULAR; INTRAVENOUS at 12:56

## 2022-07-14 RX ADMIN — HYDROMORPHONE HYDROCHLORIDE 0.5 MG: 1 INJECTION, SOLUTION INTRAMUSCULAR; INTRAVENOUS; SUBCUTANEOUS at 12:52

## 2022-07-14 ASSESSMENT — PAIN DESCRIPTION - LOCATION
LOCATION: ABDOMEN;INCISION

## 2022-07-14 ASSESSMENT — PAIN SCALES - WONG BAKER
WONGBAKER_NUMERICALRESPONSE: 0
WONGBAKER_NUMERICALRESPONSE: 0

## 2022-07-14 ASSESSMENT — PAIN - FUNCTIONAL ASSESSMENT: PAIN_FUNCTIONAL_ASSESSMENT: NONE - DENIES PAIN

## 2022-07-14 ASSESSMENT — PAIN SCALES - GENERAL
PAINLEVEL_OUTOF10: 2
PAINLEVEL_OUTOF10: 5
PAINLEVEL_OUTOF10: 3

## 2022-07-14 ASSESSMENT — PAIN DESCRIPTION - FREQUENCY
FREQUENCY: CONTINUOUS

## 2022-07-14 ASSESSMENT — PAIN DESCRIPTION - ORIENTATION
ORIENTATION: RIGHT;LEFT;MID

## 2022-07-14 ASSESSMENT — PAIN DESCRIPTION - ONSET
ONSET: ON-GOING

## 2022-07-14 ASSESSMENT — PAIN DESCRIPTION - DESCRIPTORS
DESCRIPTORS: DISCOMFORT;SORE;CRAMPING
DESCRIPTORS: SORE;DISCOMFORT
DESCRIPTORS: DISCOMFORT

## 2022-07-14 ASSESSMENT — PAIN DESCRIPTION - PAIN TYPE: TYPE: SURGICAL PAIN

## 2022-07-14 NOTE — ANESTHESIA PRE PROCEDURE
Department of Anesthesiology  Preprocedure Note       Name:  Madhuri Spears   Age:  29 y.o.  :  1985                                          MRN:  [id number]         Date:  2022      Surgeon: Tisha Darden):  Agata Gallagher MD    Procedure: Procedure(s):  ERAS HYSTERECTOMY ABDOMINAL LAPAROSCOPIC ROBOTIC    Medications prior to admission:   Prior to Admission medications    Medication Sig Start Date End Date Taking?  Authorizing Provider   dupilumab (DUPIXENT) 300 MG/2ML SOPN injection Inject 300 mg into the skin once    Historical Provider, MD   traZODone (DESYREL) 100 MG tablet Take 200 mg by mouth nightly  21   Ar Automatic Reconciliation       Current medications:    Current Facility-Administered Medications   Medication Dose Route Frequency Provider Last Rate Last Admin    sodium chloride flush 0.9 % injection 5-40 mL  5-40 mL IntraVENous 2 times per day Agata Gallagher MD        sodium chloride flush 0.9 % injection 5-40 mL  5-40 mL IntraVENous PRN Agata Gallagher MD        0.9 % sodium chloride infusion   IntraVENous PRN Agata Gallagher MD        ceFAZolin (ANCEF) 2000 mg in sterile water 20 mL IV syringe  2,000 mg IntraVENous On Call to 1 [facility], MD        lidocaine PF 1 % injection 1 mL  1 mL IntraDERmal Once PRN Saritha Pérez MD        acetaminophen (TYLENOL) tablet 1,000 mg  1,000 mg Oral Once Saritha Pérez MD        fentaNYL (SUBLIMAZE) injection 100 mcg  100 mcg IntraVENous Once PRN Saritha Pérez MD        lactated ringers infusion   IntraVENous Continuous Saritha Pérez MD        sodium chloride flush 0.9 % injection 5-40 mL  5-40 mL IntraVENous 2 times per day Saritha Pérez MD        sodium chloride flush 0.9 % injection 5-40 mL  5-40 mL IntraVENous PRN Saritha Pérez MD        0.9 % sodium chloride infusion   IntraVENous PRN Saritha Pérez MD        midazolam PF (VERSED) injection 2 mg  2 mg IntraVENous Once PRN Saritha Pérez MD        aprepitant (EMEND) capsule 40 mg  40 mg Oral Once Cheryl Solo MD           Allergies: Allergies   Allergen Reactions    Latex Rash     Other reaction(s): Rash-Allergy    Seasonal Swelling    Other      Other reaction(s): Hives/Swelling-Allergy  grapes    Wasp Venom Protein Hives       Problem List:    Patient Active Problem List   Diagnosis Code    Severe obesity ([facility] [address] E66.01    Abnormal uterine bleeding (AUB) N93.9       Past Medical History:        Diagnosis Date    Contact dermatitis and eczema due to cause     Depression     PONV (postoperative nausea and vomiting)        Past Surgical History:        Procedure Laterality Date    CHOLECYSTECTOMY      GYN      HEENT      LAP,TUBAL CAUTERY         Social History:    Social History     Tobacco Use    Smoking status: Former Smoker     Quit date: 2017     Years since quittin.1    Smokeless tobacco: Never Used   Substance Use Topics    Alcohol use: Not Currently                                Counseling given: Not Answered      Vital Signs (Current):   Vitals:    22 1247   Weight: 205 lb (93 kg)   Height: 5' 3\" (1.6 m)                                              BP Readings from Last 3 Encounters:   22 130/82   22 138/74   22 110/60       NPO Status:                                                                                 BMI:   Wt Readings from Last 3 Encounters:   22 205 lb (93 kg)   22 206 lb 9.6 oz (93.7 kg)   22 204 lb (92.5 kg)     Body mass index is 36.31 kg/m².     CBC:   Lab Results   Component Value Date/Time    WBC 8.0 2022 12:35 PM    RBC 4.18 2022 12:35 PM    HGB 12.2 2022 11:52 AM    HCT 36.8 2022 12:35 PM    MCV 88 2022 12:35 PM    RDW 13.6 2022 12:35 PM     2022 12:35 PM       CMP:   Lab Results   Component Value Date/Time     2021 12:07 PM    K 4.0 2021 12:07 PM     2021 12:07 PM    CO2 25 2021 12:07 PM    BUN 12 2021 12:07 PM    CREATININE 0.73 01/11/2021 12:07 PM    GFRAA 124 01/11/2021 12:07 PM    AGRATIO 1.8 01/11/2021 12:07 PM    GLUCOSE 91 01/11/2021 12:07 PM    PROT 7.4 01/11/2021 12:07 PM    CALCIUM 9.0 01/11/2021 12:07 PM    BILITOT 0.2 01/11/2021 12:07 PM    ALKPHOS 71 01/11/2021 12:07 PM    AST 17 01/11/2021 12:07 PM    ALT 18 01/11/2021 12:07 PM       POC Tests: No results for input(s): POCGLU, POCNA, POCK, POCCL, POCBUN, POCHEMO, POCHCT in the last 72 hours. Coags: No results found for: PROTIME, INR, APTT    HCG (If Applicable): No results found for: PREGTESTUR, PREGSERUM, HCG, HCGQUANT     ABGs: No results found for: PHART, PO2ART, KQS0JND, GBF3GQB, BEART, O5BJURUD     Type & Screen (If Applicable):  No results found for: LABABO, LABRH    Drug/Infectious Status (If Applicable):  No results found for: HIV, HEPCAB    COVID-19 Screening (If Applicable): No results found for: COVID19        Anesthesia Evaluation  Patient summary reviewed   history of anesthetic complications: PONV. Airway: Mallampati: II  TM distance: >3 FB   Neck ROM: full  Mouth opening: > = 3 FB   Dental:          Pulmonary:Negative Pulmonary ROS and normal exam                               Cardiovascular:Negative CV ROS  Exercise tolerance: good (>4 METS),                     Neuro/Psych:   Negative Neuro/Psych ROS              GI/Hepatic/Renal: Neg GI/Hepatic/Renal ROS            Endo/Other: Negative Endo/Other ROS                    Abdominal:             Vascular: negative vascular ROS. Other Findings:           Anesthesia Plan      general     ASA 1       Induction: intravenous. Anesthetic plan and risks discussed with patient and spouse.                         Olivia Araiza MD   7/14/2022

## 2022-07-14 NOTE — PERIOP NOTE
TRANSFER - OUT REPORT:    Verbal report given to *** on Aaron Simpson  being transferred to *** for {Transfer PXLV:[id number]}       Report consisted of patient's Situation, Background, Assessment and   Recommendations(SBAR). Information from the following report(s) {SBAR Reports List:47947} was reviewed with the receiving nurse. Lines:   Peripheral IV 07/14/22 Left;Posterior Hand (Active)   Site Assessment Clean, dry & intact 07/14/22 1304   Line Status Infusing 07/14/22 1304   Phlebitis Assessment No symptoms 07/14/22 1304   Infiltration Assessment 0 07/14/22 1304   Alcohol Cap Used No 07/14/22 1304   Dressing Status Clean, dry & intact 07/14/22 1304   Dressing Type Transparent 07/14/22 1304        Opportunity for questions and clarification was provided.       Patient transported with:  {Transport Details:92906}

## 2022-07-14 NOTE — PERIOP NOTE
Sister Himanshu Ruffin in waiting room or in car in hospital parking lot call with all updates [phone]

## 2022-07-14 NOTE — ANESTHESIA POSTPROCEDURE EVALUATION
Department of Anesthesiology  Postprocedure Note    Patient: Eric Lopez  MRN: [id number]  YOB: 1985  Date of evaluation: 7/14/2022      Procedure Summary     Date: 07/14/22 Room / Location[place] [facility] [place]  / [facility] [place]    Anesthesia Start: 1016 Anesthesia Stop: 2382    Procedure: ERAS HYSTERECTOMY ABDOMINAL LAPAROSCOPIC ROBOTIC (N/A Abdomen) Diagnosis:       Dysfunctional uterine bleeding      Polyp of cervix      Postcoital bleeding      (Dysfunctional uterine bleeding [N93.8])      (Polyp of cervix [N84.1])      (Postcoital bleeding [N93.0])    Surgeons: Dayanara Palomares MD Responsible Provider: Shirin Hermosillo MD    Anesthesia Type: general ASA Status: 1          Anesthesia Type: No value filed.     Bola Phase I: Bola Score: 8    Bola Phase II:        Anesthesia Post Evaluation    Patient location during evaluation: PACU  Patient participation: complete - patient participated  Level of consciousness: awake and alert  Airway patency: patent  Nausea & Vomiting: no nausea and no vomiting  Complications: no  Cardiovascular status: hemodynamically stable  Respiratory status: acceptable, nonlabored ventilation and spontaneous ventilation  Hydration status: euvolemic  Comments: BP (!) 140/75   Pulse 91   Temp 98.6 °F (37 °C) (Oral)   Resp 17   Ht 5' 3\" (1.6 m)   Wt 205 lb (93 kg)   SpO2 97%   BMI 36.31 kg/m²     Multimodal analgesia pain management approach

## 2022-07-14 NOTE — PERIOP NOTE
TRANSFER - OUT REPORT:    Verbal report given to Nely Esquivel RN on [facility]  being transferred to [phone] for routine progression of patient care       Report consisted of patient's Situation, Background, Assessment and   Recommendations(SBAR). Information from the following report(s) Nurse Handoff Report and Cardiac Rhythm SR was reviewed with the receiving nurse. Lines:   Peripheral IV 07/14/22 Left;Posterior Hand (Active)   Site Assessment Clean, dry & intact 07/14/22 1304   Line Status Infusing 07/14/22 1304   Phlebitis Assessment No symptoms 07/14/22 1304   Infiltration Assessment 0 07/14/22 1304   Alcohol Cap Used No 07/14/22 1304   Dressing Status Clean, dry & intact 07/14/22 1304   Dressing Type Transparent 07/14/22 1304        Opportunity for questions and clarification was provided.       Patient transported with:  O2 @ 2lpm

## 2022-07-14 NOTE — BRIEF OP NOTE
Brief Postoperative Note    Patient: Loni Park  YOB: 1985  MRN: [id number]    Date of Procedure: 07/14/22      Pre-Op Diagnosis: Dysfunctional uterine bleeding [N93.8]  Polyp of cervix [N84.1]  Postcoital bleeding [N93.0]    Post-Op Diagnosis: Same as preoperative diagnosis. Procedure(s):  ERAS HYSTERECTOMY ABDOMINAL LAPAROSCOPIC ROBOTIC    Surgeon(s):  Francine Haq MD    Surgical Assistant: None    Anesthesia: General     Estimated Blood Loss (mL): less than 50     Complications: None    Specimens:   ID Type Source Tests Collected by Time Destination   A : UTERUS, CERVIX, BILATERAL FALLOPIAN TUBES Tissue Uterus SURGICAL PATHOLOGY Francine Haq MD 7/14/2022 1131         Implants: * No implants in log *    Drains:   [REMOVED] Urinary Catheter 2 Way (Removed)       Findings: Enlarged globular uterus. Bilateral tubes and ovaries were normal with Hulka clips on the tubes. Normal cystoscopy.     Electronically Signed by Francine Haq MD, MD on 7/14/2022 at 12:14 PM

## 2022-07-14 NOTE — PROGRESS NOTES
Gynecology Progress Note    Patient doing well post-op day 0 without significant complaints. Pain controlled on current medication. Voiding without difficulty. Vitals:  Blood pressure (!) 140/75, pulse 91, temperature 98.6 °F (37 °C), temperature source Oral, resp. rate 17, height 5' 3\" (1.6 m), weight 205 lb (93 kg), SpO2 97 %, not currently breastfeeding. Temp (24hrs), Av.1 °F (36.7 °C), Min:97.5 °F (36.4 °C), Max:98.6 °F (37 °C)        Exam:  Patient without distress. Abdomen soft,  Mild tender. Incisions dry and clean without erythema. Lower extremities are negative for swelling, cords, or tenderness. Assessment and Plan:  Patient appears to be having uncomplicated post Op course. Continue routine post-op care.  Wishes discharge home

## 2022-07-14 NOTE — PERIOP NOTE
Attempted to call report for patient's transfer to room 357. Dashawn Fleming states the RN is on break and will call me back.

## 2022-07-15 NOTE — OP NOTE
Operative Note    Patient: Eryn Hendrix MRN: [id number]  SSN: [id number]    YOB: 1985  Age: 29 y.o. Sex: female      Date of Surgery: 07/14/2022     Preoperative Diagnosis: Dysfunctional uterine bleeding [N93.8]  Polyp of cervix [N84.1]  Postcoital bleeding [N93.0]    Postoperative Diagnosis: Same as preop    Surgeon(s) and Role:     * Tiny Garcia MD - Primary       Anesthesia: General    Procedure: Total Robotic Assisted Laparoscopic Hysterectomy with Bilateral Salpingectomy     Findings: enlarged uterus, normal BSO, and Hulka clips on both tubes    Estimated Blood Loss: less than 50     Drains: none    Pathology/Specimens:     ID Type Source Tests Collected by Time Destination   A : UTERUS, CERVIX, BILATERAL FALLOPIAN TUBES Tissue Uterus SURGICAL PATHOLOGY Tiny Garcia MD 7/14/2022 1131        DVT Prophylaxis: EMILIA Hose    Antibiotic Prophylaxis: Ancef    Procedure in Detail:  After an adequate level of anesthesia was obtained, the patient was prepped and draped in the usual sterile fashion in the dorsal lithotomy position. Time out was performed and agreed upon by all present. Monreal catheter was placed. Pneumatic compression devices were on a working. A weighted speculum was placed in vagina and the anterior aspect and the cervix was grasped with a tenaculum. The uterus was sounded to a depth of 11 cm and cervix dilated to allow V care manipulator to be placed. The infraumbilical incision was injected with 0.5% marcaine and incision made and the Veress needle inserted. Position was verified with water drop and opening pressure of 4. The peritoneal cavity was insufflated to pressure of 15 with CO2. The robotic non-bladed trocar was placed. Under visualization a 8mm robotic lateral port was placed on the right and a 8mm robotic and 5mm airseal port was placed on the left. The robot was then attached to the trocars and the instruments were placed under visualization.  I broke scrub and went to the console. Monopolar and Vessel Sealer were used as energy sources throughout the case. The right tube was elevated and detached and utero-ovarian ligaments were coagulated and cut. The round ligament was coagulated and cut. The bladder flap was continued to be developed. The broad ligament was coagulated and cut. The bladder flap was developed on the right side. The uterine vessels were then skeletonized and coagulated at the level of the internal os. The same procedure was repeated on the left. The cardinal ligaments were taken down to the vaginal portion of manipulator and this was circumscribed anteriorly and posteriorly. Hemostasis was noted. The uterus, tubes were removed. The vaginal cuff was closed with 2-0 V Loc in double layer running stich. The area was irrigated free of blood clots and debris. Hemostasis was noted at all points. The robot was detached. I again scrubbed, performed cystoscopy and bilateral ureteral jets were easily seen. Bladder was noted to be free of defects or foreign bodies. I changed gloved and performed laparoscopy and all areas remained hemostatic as CO2 pressure was lowered. I removed the trocars under visualization and closed with 4-0 monocryl for each of the port sites and then covered with DermaBond on the skin. All counts were reported as correct. Monreal catheter was removed. The patient tolerated the procedure well. She was awakened and went to the recovery room in stable condition.        Signed By: Neptali Dinh MD, MD     July 15, 2022

## 2022-07-21 NOTE — PROGRESS NOTES
7-21-22    [facility]    POD 7    Post Discharge Phone Call:    Patient states pain is 0/10. Not requiring any pain medications for the past two days. Is tolerating regular diet without any c/o n/v at present. Unable to tolerate Ensure Complete before and after surgery d/t IBS. States drank a couple after surgery. Having daily BM's and voiding without much difficulty (cramping sensation) per patient. Abdominal incisions are C/D/I. Continues with light walking and daily antibacterial showers. Follow up with Dr. Alberto Zacarias on 8-1-22.

## 2022-08-01 ENCOUNTER — OFFICE VISIT (OUTPATIENT)
Dept: OBGYN CLINIC | Age: 37
End: 2022-08-01

## 2022-08-01 VITALS
SYSTOLIC BLOOD PRESSURE: 108 MMHG | DIASTOLIC BLOOD PRESSURE: 62 MMHG | WEIGHT: 207.2 LBS | HEIGHT: 63 IN | BODY MASS INDEX: 36.71 KG/M2

## 2022-08-01 DIAGNOSIS — Z09 POSTOPERATIVE EXAMINATION: Primary | ICD-10-CM

## 2022-08-01 PROCEDURE — 99024 POSTOP FOLLOW-UP VISIT: CPT | Performed by: OBSTETRICS & GYNECOLOGY

## 2022-08-01 NOTE — PROGRESS NOTES
Faby Eason presents for postop visit from Total Robotic Assisted Laparoscopic Hysterectomy with Bilateral Salpingectomy about 2 weeks ago on 7/15/2022. Doing well postoperatively. without any bleeding. Fever: No Voiding well: No.  Bowel movements OK: No.     Doing well. No pain    Exam: A&OX3, NAD. Abdomen:  Non tender Incisions clean, dry, intact. Vaginal cuff is intact to inspection and palpation    Discussed pathology report which was  \" UTERUS, CERVIX, BILATERAL FALLOPIAN TUBES\":         ADENOMYOMA. CHRONIC CERVICITIS WITH SQUAMOUS METAPLASIA. NON-SECRETORY ENDOMETRIUM     A/P. Stable Post op condition. Gradually increase activity. Resumption of sexual activity is not encouraged at this time. Follow up 4 weeks.

## 2022-08-31 ENCOUNTER — OFFICE VISIT (OUTPATIENT)
Dept: OBGYN CLINIC | Age: 37
End: 2022-08-31
Payer: OTHER GOVERNMENT

## 2022-08-31 VITALS
HEIGHT: 63 IN | SYSTOLIC BLOOD PRESSURE: 112 MMHG | WEIGHT: 209 LBS | DIASTOLIC BLOOD PRESSURE: 60 MMHG | BODY MASS INDEX: 37.03 KG/M2

## 2022-08-31 DIAGNOSIS — Z09 POSTOPERATIVE EXAMINATION: Primary | ICD-10-CM

## 2022-08-31 DIAGNOSIS — R82.90 MALODOROUS URINE: ICD-10-CM

## 2022-08-31 LAB
BILIRUBIN, URINE, POC: NEGATIVE
BLOOD URINE, POC: NEGATIVE
GLUCOSE URINE, POC: NEGATIVE
KETONES, URINE, POC: NEGATIVE
LEUKOCYTE ESTERASE, URINE, POC: NEGATIVE
NITRITE, URINE, POC: NEGATIVE
PH, URINE, POC: 5.5 (ref 4.6–8)
PROTEIN,URINE, POC: NEGATIVE
SPECIFIC GRAVITY, URINE, POC: 1.01 (ref 1–1.03)
URINALYSIS CLARITY, POC: CLEAR
URINALYSIS COLOR, POC: YELLOW
UROBILINOGEN, POC: NORMAL

## 2022-08-31 PROCEDURE — 99024 POSTOP FOLLOW-UP VISIT: CPT | Performed by: OBSTETRICS & GYNECOLOGY

## 2022-08-31 PROCEDURE — 81003 URINALYSIS AUTO W/O SCOPE: CPT | Performed by: OBSTETRICS & GYNECOLOGY

## 2022-08-31 NOTE — PROGRESS NOTES
Shannan Cee presents for postop visit from Hysterectomy Abdominal Laparoscopic Robotic on 7/14/22 about 6 weeks ago. Doing well postoperatively. without any bleeding. Fever: No Voiding well: Yes. Bowel movements OK: Yes. Complains of a pulling sensation when emptying her bladder; when leaning forward it worsens and she feels tight cramping. Exam: A&OX3, NAD. Abdomen:  Non tender Incisions clean, dry, intact. Vaginal cuff intact to inspection and palpation    A/P. Stable Post op condition. Gradually increase activity. Resumption of sexual activity is not encouraged at this time discussed at least 2 more weeks. Follow up as needed. Return to work.

## 2022-09-07 ENCOUNTER — OFFICE VISIT (OUTPATIENT)
Dept: SURGERY | Age: 37
End: 2022-09-07
Payer: OTHER GOVERNMENT

## 2022-09-07 VITALS
SYSTOLIC BLOOD PRESSURE: 129 MMHG | DIASTOLIC BLOOD PRESSURE: 84 MMHG | BODY MASS INDEX: 34.49 KG/M2 | WEIGHT: 207 LBS | HEART RATE: 73 BPM | HEIGHT: 65 IN

## 2022-09-07 DIAGNOSIS — E66.09 CLASS 1 OBESITY DUE TO EXCESS CALORIES WITHOUT SERIOUS COMORBIDITY WITH BODY MASS INDEX (BMI) OF 34.0 TO 34.9 IN ADULT: Primary | ICD-10-CM

## 2022-09-07 PROCEDURE — 99205 OFFICE O/P NEW HI 60 MIN: CPT | Performed by: SURGERY

## 2022-09-07 NOTE — PROGRESS NOTES
Nina Olivier MD   Bariatric & Advanced Laparoscopic Surgery & Endoscopy  [address] [facility] [address] [facility]  [place]                                                     Office [phone] Fax [phone]        Date of visit: 9/7/2022      History and Physical    Patient: Corie Flores MRN: [id number]     YOB: 1985  Age: 29 y.o. Sex: female              PCP: Cecelia San MD   Pharmacy:   [facility] [address] [facility]  [address] [facility] [address]  Phone: [phone] Fax: [phone]     Date:  9/7/2022      Corie Flores  who presents to the [facility] for consultation to assist in weight loss. This is her initial consultation preparing her for our multi-disciplinary weight loss program.  She presents with a height of 5' 4.5\" (1.638 m) and weight of 207 lb (93.9 kg), giving her a Body mass index is 34.98 kg/m². She has an Ideal body weight of 143 lbs, and excess body weight of 64 lbs. NC 15.5, WC 39  Lowest weight 170 lbs  Highest weight 215 lbs  Biggest challenge to weight loss - emotional eating    MEDICAL HISTORY:  Morbid Obesity       Comorbidity Yes or No   Hypertension- how many medications = NA No   Hyperlipidemia No   Diabetes Mellitus  Insulin dependent = No  Last A1c = ? No   Coronary Artery Disease No   Gastroesophageal Reflux  Treatment Med = No No   Obstructive Sleep Apnea No   Cancer No   Asthma No   Osteoarthritis No   Joint Pain No      She denies dysphagia.        Other Yes or No   Migraines No   Seizures or Epilepsy No   Glaucoma No   Hyperthyroidism No   Irregular Heartbeat or palpitations No   Gastroparesis No   History of gallstones or pancreatitis Yes - s/p cholecystectomy   Malabsorption No   Kidney Stones No       Venous Stasis No   Dialysis No   Long term use of steroid or immunocompromised conditions No   Chronic opioids No   On Anticoagulants No She denies personal or family history of problems with anesthesia, bleeding, or clotting. She denies history of DVT or pulmonary embolism. DENTAL/CHEWING ABILITY:  No dental issues with chewing. PRIOR WEIGHT LOSS ATTEMPTS:  has participated in supervised weight loss. This consists of the following programs:  Nutrisystem. She lost about 45 pounds. EXERCISE ASSESSMENT:  Walking daily. Reviewed NIH guidelines. PSYCHOSOCIAL:  She notes she is  and states main support person is Yuliana Hernadez (). She is employed as a [employer] in [employer]. Her goal in pursuing medical weight loss is to improve health. She denies any active psychological problems and reports appropriate treatment of depression/anxiety. She states she is independent in her care, can drive a car, and can ambulate without assistance. HISTORY:  Past Medical History:   Diagnosis Date    Contact dermatitis and eczema due to cause     Depression     PONV (postoperative nausea and vomiting)        Mammogram Result (most recent):  No results found for this or any previous visit from the past 3650 days.      Last pap smear - s/p hysterectomy 2022    Last colonoscopy: NA    Past Surgical History:   Procedure Laterality Date    CHOLECYSTECTOMY      GYN      HEENT      HYSTERECTOMY (CERVIX STATUS UNKNOWN) N/A 2022    Robotic and cervix removed    LAP,TUBAL CAUTERY      TONSILLECTOMY       Social History     Tobacco Use    Smoking status: Former     Types: Cigarettes     Quit date: 2017     Years since quittin.3    Smokeless tobacco: Never   Substance Use Topics    Alcohol use: Not Currently    Drug use: Not Currently     Types: Prescription     Family History   Problem Relation Age of Onset    Hypertension Father     No Known Problems Mother     Hypertension Maternal Grandmother     Endometriosis Maternal Grandmother     Endometriosis Maternal Aunt     Diabetes Maternal Grandmother Any family history of Multiple Endocrine Neoplasia Syndrome or Medullary Thyroid Carcinoma? No     MEDICATIONS:  Current Outpatient Medications   Medication Sig Dispense Refill    ibuprofen (ADVIL;MOTRIN) 800 MG tablet Take 1 tablet by mouth every 6 hours as needed for Pain 40 tablet 3    dupilumab (DUPIXENT) 300 MG/2ML SOPN injection Inject 300 mg into the skin every 14 days      traZODone (DESYREL) 100 MG tablet Take 200 mg by mouth nightly        No current facility-administered medications for this visit. ALLERGIES:  Allergies   Allergen Reactions    Latex Rash     Other reaction(s): Rash-Allergy    Seasonal Swelling    Other      Other reaction(s): Hives/Swelling-Allergy  grapes    Wasp Venom Protein Hives       ROS: The patient has no difficulty with chest pain or shortness of breath. No fever or chills. Comprehensive 13 point review of systems was otherwise unremarkable except as noted above. Physical Exam:     /84   Pulse 73   Ht 5' 4.5\" (1.638 m)   Wt 207 lb (93.9 kg)   LMP 11/10/2021 (Exact Date)   BMI 34.98 kg/m²        No flowsheet data found. General:  Well-developed, well-nourished, no distress. Psych:  Cooperative, good insight and judgement. Neuro:  Alert, oriented to person, place and time. HEENT:  Normocephalic, atraumatic. Sclera clear. Lungs:  Unlabored breathing. Symmetrical chest expansion. Chest wall:  No tenderness or deformity. Heart:  Regular rate and rhythm. No JVD. Abdomen:  Soft, non-tender, non-distended. No guarding or rebound. No palpable masses. Extremities:  Extremities normal, atraumatic, no cyanosis or edema. Skin:  Skin color, texture, turgor normal. No rashes. ASSESSMENT:  Morbid obesity with a Body mass index is 34.98 kg/m².  beginning multi-disciplinary weight loss prep program.    PLAN:  We have discussed the patient's participation and reviewed the steps in our weight loss program. She has had a long history of failed diet and exercise programs and has good understanding about the risks, benefits, and commitment related to medical weight loss. She is interested in proceeding with our program seeking the medical weight loss. Diagnosis Orders   1. Class 1 obesity due to excess calories without serious comorbidity with body mass index (BMI) of 34.0 to 34.9 in adult  CBC with Auto Differential    Comprehensive Metabolic Panel    Lipid Panel    TSH    Vitamin D 25 Hydroxy    Uric Acid    Hemoglobin A1C    Pregnancy, Urine    EKG 12 Lead          1. Discussed in detail our comprehensive weight loss program as well as the incorporation of medications. We also discussed that the participation in our program is elective. The patient verbalized and demonstrated a clear understanding of what to expect. 2.  Patient is an excellent candidate with a clear medical necessity for weight loss. 3.  Encouraged patient to participate in our Bariatric Support Group. 4.  Advised that weight loss medication is only one part of a lifelong weight management program, and that sustainable weight loss will only come with major changes in eating habits, physical activity and behavior. Advised that obesity is a chronic disease and requires a commitment to self-management and long-term follow up. 5.  Nutrition Recommendations: please see dietitian notes. 6.  Exercise Recommendations: Exercise routine, incorporating both cardio and strength training, building up to 5x/wk for at least 300 minutes a week. She will complete the following steps:  1. Complete blood work. 2.  Participation in our behavior modification program, reduced calorie diet program, and participation in our exercise program recommendations supervised by our office. 3.  Reminded of policy of no weight gain during prep period.   4.  EKG  +    She will return after the above are complete for assessment of her progress in 2-4 weeks and we will discuss the medication of choice. Time: I spent 60 minutes preparing to see patient (including chart review and preparation), obtaining and/or reviewing additional medical history, performing a physical exam and evaluation, documenting clinical information in the electronic health record, independently interpreting results, communicating results to patient, family or caregiver, and/or coordinating care.         Signed: Clarisa Bernstein MD  Bariatric & Minimally Invasive Surgery  9/7/2022

## 2022-09-07 NOTE — PROGRESS NOTES
Joe rCistobal MS, RD, LD  Surgical Weight Loss Dietitian  [address] [facility] [address] [address]  Phone [phone]   Fax [phone]    Obesity Medicine Initial Nutrition Assessment     Assessment:  Pt presents for consideration of obesity medicine program.  Assessment completed with Dr. Arita Cushing. Anthropometrics:    Initial Consult Date 9/7/2022   Initial Height 5'4.5\"   Initial Weight 207lb   IBW 143lb   EBW 64lb   Initial Neck 15.5\"   Initial Waist 39.0\"   Initial %BF Not assessed today   REE N/a     Intervention:   Provided program manual and instructed to read before follow-up diet visit. Encouraged tracking nutrition intake through a food journal or digital tracking application. Monitoring and Evaluation:  Monitor for continued safe, supervised weight loss for OM   Monitor for labs, EKG.   F/U per MD Joe Cristobal MS, RD, LD

## 2022-10-21 ENCOUNTER — RX ONLY (OUTPATIENT)
Age: 37
Setting detail: RX ONLY
End: 2022-10-21

## 2022-10-21 RX ORDER — DUPILUMAB 300 MG/2ML
INJECTION, SOLUTION SUBCUTANEOUS
Qty: 2 | Refills: 6 | Status: ERX

## 2022-10-24 ENCOUNTER — RX ONLY (OUTPATIENT)
Age: 37
Setting detail: RX ONLY
End: 2022-10-24

## 2022-10-24 RX ORDER — DUPILUMAB 300 MG/2ML
INJECTION, SOLUTION SUBCUTANEOUS
Qty: 2 | Refills: 6 | Status: ERX

## 2022-11-02 RX ORDER — TRAZODONE HYDROCHLORIDE 100 MG/1
100 TABLET ORAL NIGHTLY
Qty: 90 TABLET | Refills: 1 | Status: SHIPPED | OUTPATIENT
Start: 2022-11-02

## 2022-11-29 ENCOUNTER — APPOINTMENT (RX ONLY)
Dept: URBAN - METROPOLITAN AREA CLINIC 329 | Facility: CLINIC | Age: 37
Setting detail: DERMATOLOGY
End: 2022-11-29

## 2022-11-29 DIAGNOSIS — L91.8 OTHER HYPERTROPHIC DISORDERS OF THE SKIN: ICD-10-CM

## 2022-11-29 DIAGNOSIS — Z41.9 ENCOUNTER FOR PROCEDURE FOR PURPOSES OTHER THAN REMEDYING HEALTH STATE, UNSPECIFIED: ICD-10-CM

## 2022-11-29 DIAGNOSIS — L20.89 OTHER ATOPIC DERMATITIS: ICD-10-CM | Status: IMPROVED

## 2022-11-29 PROCEDURE — ? SKIN TAG REMOVAL

## 2022-11-29 PROCEDURE — 99214 OFFICE O/P EST MOD 30 MIN: CPT | Mod: 25

## 2022-11-29 PROCEDURE — ? RECOMMENDATIONS

## 2022-11-29 PROCEDURE — ? PRESCRIPTION MEDICATION MANAGEMENT

## 2022-11-29 PROCEDURE — ? COSMETIC CONSULTATION: FILLERS

## 2022-11-29 PROCEDURE — ? PRESCRIPTION

## 2022-11-29 PROCEDURE — 11200 RMVL SKIN TAGS UP TO&INC 15: CPT

## 2022-11-29 PROCEDURE — ? COUNSELING

## 2022-11-29 RX ORDER — DUPILUMAB 300 MG/2ML
INJECTION, SOLUTION SUBCUTANEOUS
Qty: 2 | Refills: 6 | Status: ERX

## 2022-11-29 RX ORDER — RUXOLITINIB 15 MG/G
CREAM TOPICAL
Qty: 60 | Refills: 3 | Status: ERX | COMMUNITY
Start: 2022-11-29

## 2022-11-29 RX ADMIN — RUXOLITINIB: 15 CREAM TOPICAL at 00:00

## 2022-11-29 ASSESSMENT — LOCATION SIMPLE DESCRIPTION DERM
LOCATION SIMPLE: LEFT THIGH
LOCATION SIMPLE: LEFT LIP
LOCATION SIMPLE: RIGHT THIGH
LOCATION SIMPLE: LEFT HAND
LOCATION SIMPLE: RIGHT HAND
LOCATION SIMPLE: RIGHT HAND

## 2022-11-29 ASSESSMENT — LOCATION DETAILED DESCRIPTION DERM
LOCATION DETAILED: RIGHT DORSAL MIDDLE FINGER METACARPOPHALANGEAL JOINT
LOCATION DETAILED: RIGHT DORSAL MIDDLE FINGER METACARPOPHALANGEAL JOINT
LOCATION DETAILED: RIGHT ANTERIOR PROXIMAL THIGH
LOCATION DETAILED: RIGHT RADIAL DORSAL HAND
LOCATION DETAILED: LEFT ULNAR DORSAL HAND
LOCATION DETAILED: LEFT ANTERIOR PROXIMAL THIGH
LOCATION DETAILED: LEFT SUPERIOR VERMILION LIP
LOCATION DETAILED: LEFT INFERIOR VERMILION LIP
LOCATION DETAILED: LEFT ANTERIOR MEDIAL PROXIMAL THIGH

## 2022-11-29 ASSESSMENT — LOCATION ZONE DERM
LOCATION ZONE: LEG
LOCATION ZONE: HAND
LOCATION ZONE: HAND
LOCATION ZONE: LIP

## 2022-11-29 NOTE — PROCEDURE: RECOMMENDATIONS
Detail Level: Zone
Render Risk Assessment In Note?: no
Recommendations (Free Text): Patient states that she starts having a flare when she is due for her shot, once she has injection she clears within a day.
Recommendations (Free Text): Discussed filler to lip\\nRestylane silk

## 2022-11-29 NOTE — PROCEDURE: PRESCRIPTION MEDICATION MANAGEMENT
Detail Level: Detailed
Initiate Treatment: Opzelura bid
Continue Regimen: Dupixent \\nMoisturizing regularly
Render In Strict Bullet Format?: No

## 2023-02-16 RX ORDER — DUPILUMAB 300 MG/2ML
INJECTION, SOLUTION SUBCUTANEOUS
Qty: 3 | Refills: 2 | Status: ERX

## 2023-05-18 NOTE — PROGRESS NOTES
lesions. ASSESSMENT:  Encounter Diagnoses   Name Primary? Vaginal discharge Yes    Vaginal itching        PLAN:  All questions answered  NuSwab vaginitis plus  Probiotic encouraged  Diflucan to pharmacy  Will call pt with results and make adjustments accordingly    Orders Placed This Encounter   Procedures    Nuswab Vaginitis Plus (VG+)     Standing Status:   Future     Number of Occurrences:   1     Standing Expiration Date:   5/19/2024         Supervising physician is Dr. Lauren Carpenter. Greater than 50% of the 20 minute visit were spent in counseling to the above topics.

## 2023-05-19 ENCOUNTER — OFFICE VISIT (OUTPATIENT)
Dept: OBGYN CLINIC | Age: 38
End: 2023-05-19
Payer: OTHER GOVERNMENT

## 2023-05-19 VITALS
SYSTOLIC BLOOD PRESSURE: 114 MMHG | HEIGHT: 65 IN | DIASTOLIC BLOOD PRESSURE: 80 MMHG | WEIGHT: 205.2 LBS | BODY MASS INDEX: 34.19 KG/M2

## 2023-05-19 DIAGNOSIS — N89.8 VAGINAL ITCHING: ICD-10-CM

## 2023-05-19 DIAGNOSIS — N89.8 VAGINAL DISCHARGE: Primary | ICD-10-CM

## 2023-05-19 PROCEDURE — 99213 OFFICE O/P EST LOW 20 MIN: CPT | Performed by: NURSE PRACTITIONER

## 2023-05-19 RX ORDER — FLUCONAZOLE 150 MG/1
TABLET ORAL
Qty: 2 TABLET | Refills: 0 | Status: SHIPPED | OUTPATIENT
Start: 2023-05-19

## 2023-05-19 RX ORDER — BUPROPION HYDROCHLORIDE 150 MG/1
150 TABLET ORAL DAILY
COMMUNITY
Start: 2022-10-03 | End: 2023-05-19

## 2023-05-23 ENCOUNTER — TELEPHONE (OUTPATIENT)
Dept: OBGYN CLINIC | Age: 38
End: 2023-05-23

## 2023-05-23 DIAGNOSIS — B96.89 BACTERIAL VAGINOSIS: Primary | ICD-10-CM

## 2023-05-23 DIAGNOSIS — N76.0 BACTERIAL VAGINOSIS: Primary | ICD-10-CM

## 2023-05-23 LAB
A VAGINAE DNA VAG QL NAA+PROBE: ABNORMAL SCORE
BVAB2 DNA VAG QL NAA+PROBE: ABNORMAL SCORE
C ALBICANS DNA VAG QL NAA+PROBE: NEGATIVE
C GLABRATA DNA VAG QL NAA+PROBE: NEGATIVE
C TRACH RRNA SPEC QL NAA+PROBE: NEGATIVE
MEGA1 DNA VAG QL NAA+PROBE: ABNORMAL SCORE
N GONORRHOEA RRNA SPEC QL NAA+PROBE: NEGATIVE
SPECIMEN SOURCE: ABNORMAL
T VAGINALIS RRNA SPEC QL NAA+PROBE: NEGATIVE

## 2023-05-23 RX ORDER — METRONIDAZOLE 7.5 MG/G
GEL VAGINAL
Qty: 70 G | Refills: 0 | Status: CANCELLED | OUTPATIENT
Start: 2023-05-23 | End: 2023-05-30

## 2023-05-24 RX ORDER — METRONIDAZOLE 7.5 MG/G
GEL VAGINAL
Qty: 45 G | Refills: 0 | Status: SHIPPED | OUTPATIENT
Start: 2023-05-24 | End: 2023-05-31

## 2023-09-01 RX ORDER — DUPILUMAB 300 MG/2ML
INJECTION, SOLUTION SUBCUTANEOUS
Qty: 3 | Refills: 2 | Status: ERX

## 2023-10-30 ENCOUNTER — APPOINTMENT (RX ONLY)
Dept: URBAN - METROPOLITAN AREA CLINIC 329 | Facility: CLINIC | Age: 38
Setting detail: DERMATOLOGY
End: 2023-10-30

## 2023-10-30 DIAGNOSIS — L20.89 OTHER ATOPIC DERMATITIS: ICD-10-CM | Status: WELL CONTROLLED

## 2023-10-30 PROCEDURE — ? PRESCRIPTION MEDICATION MANAGEMENT

## 2023-10-30 PROCEDURE — 99214 OFFICE O/P EST MOD 30 MIN: CPT

## 2023-10-30 PROCEDURE — ? FULL BODY SKIN EXAM - DECLINED

## 2023-10-30 PROCEDURE — ? PRESCRIPTION

## 2023-10-30 PROCEDURE — ? COUNSELING

## 2023-10-30 RX ORDER — DUPILUMAB 300 MG/2ML
INJECTION, SOLUTION SUBCUTANEOUS
Qty: 1 | Refills: 5 | Status: ERX

## 2023-10-30 ASSESSMENT — LOCATION SIMPLE DESCRIPTION DERM
LOCATION SIMPLE: RIGHT HAND
LOCATION SIMPLE: LEFT HAND
LOCATION SIMPLE: RIGHT HAND

## 2023-10-30 ASSESSMENT — LOCATION ZONE DERM
LOCATION ZONE: HAND
LOCATION ZONE: HAND

## 2023-10-30 ASSESSMENT — LOCATION DETAILED DESCRIPTION DERM
LOCATION DETAILED: RIGHT DORSAL MIDDLE FINGER METACARPOPHALANGEAL JOINT
LOCATION DETAILED: LEFT ULNAR DORSAL HAND
LOCATION DETAILED: RIGHT DORSAL MIDDLE FINGER METACARPOPHALANGEAL JOINT
LOCATION DETAILED: RIGHT RADIAL DORSAL HAND

## 2023-10-30 NOTE — PROCEDURE: PRESCRIPTION MEDICATION MANAGEMENT
Detail Level: Detailed
Continue Regimen: Dupixent \\nMoisturizing regularly
Render In Strict Bullet Format?: No

## 2024-01-17 NOTE — PROGRESS NOTES
HPI:  Ms. Lizarraga is a 38 y.o. female who is here today for a well woman exam. She complains of nothing.   Requesting std screening (chlamydia, gonorrhea, trichomonas).  She also has concern about decreased libido.    Allergies   Allergen Reactions    Latex Rash     Other reaction(s): Rash-Allergy    Other Hives     Grapes     Seasonal Swelling    Wasp Venom Protein Hives       Current Outpatient Medications   Medication Sig Dispense Refill    phentermine (ADIPEX-P) 37.5 MG tablet Take 0.5 tablets by mouth daily. Max Daily Amount: 18.75 mg      traZODone (DESYREL) 100 MG tablet Take 1 tablet by mouth nightly 90 tablet 1    dupilumab (DUPIXENT) 300 MG/2ML SOPN injection Inject 2 mLs into the skin every 14 days       No current facility-administered medications for this visit.       Past Medical History:   Diagnosis Date    Contact dermatitis and eczema due to cause     Depression     PONV (postoperative nausea and vomiting)      Past Surgical History:   Procedure Laterality Date    CHOLECYSTECTOMY      GYN      HEENT      HYSTERECTOMY (CERVIX STATUS UNKNOWN) N/A 2022    Robotic and cervix removed    LAP,TUBAL CAUTERY      TONSILLECTOMY  2012     Social History     Socioeconomic History    Marital status:      Spouse name: Not on file    Number of children: Not on file    Years of education: Not on file    Highest education level: Not on file   Occupational History    Not on file   Tobacco Use    Smoking status: Former     Current packs/day: 0.00     Types: Cigarettes     Quit date: 2017     Years since quittin.6    Smokeless tobacco: Never   Substance and Sexual Activity    Alcohol use: Not Currently    Drug use: Not Currently     Types: Prescription    Sexual activity: Yes     Partners: Male     Birth control/protection: Surgical     Comment: hysterectomy   Other Topics Concern    Not on file   Social History Narrative    Not on file     Social Determinants of Health     Financial Resource

## 2024-01-18 ENCOUNTER — OFFICE VISIT (OUTPATIENT)
Dept: OBGYN CLINIC | Age: 39
End: 2024-01-18
Payer: OTHER GOVERNMENT

## 2024-01-18 VITALS
HEIGHT: 65 IN | SYSTOLIC BLOOD PRESSURE: 118 MMHG | BODY MASS INDEX: 35.67 KG/M2 | DIASTOLIC BLOOD PRESSURE: 70 MMHG | WEIGHT: 214.1 LBS

## 2024-01-18 DIAGNOSIS — Z11.3 SCREENING EXAMINATION FOR STD (SEXUALLY TRANSMITTED DISEASE): ICD-10-CM

## 2024-01-18 DIAGNOSIS — R68.82 DECREASED LIBIDO: ICD-10-CM

## 2024-01-18 DIAGNOSIS — Z13.89 SCREENING FOR GENITOURINARY CONDITION: ICD-10-CM

## 2024-01-18 DIAGNOSIS — Z01.419 WELL WOMAN EXAM: Primary | ICD-10-CM

## 2024-01-18 LAB
BILIRUBIN, URINE, POC: NEGATIVE
BLOOD URINE, POC: NORMAL
ESTRADIOL SERPL-MCNC: 99.72 PG/ML
FSH SERPL-ACNC: 4.7 MIU/ML
GLUCOSE URINE, POC: NEGATIVE
KETONES, URINE, POC: NEGATIVE
LEUKOCYTE ESTERASE, URINE, POC: NEGATIVE
NITRITE, URINE, POC: NEGATIVE
PH, URINE, POC: 7 (ref 4.6–8)
PROTEIN,URINE, POC: NEGATIVE
SPECIFIC GRAVITY, URINE, POC: 1.02 (ref 1–1.03)
TSH, 3RD GENERATION: 1.71 UIU/ML (ref 0.36–3.74)
URINALYSIS CLARITY, POC: CLEAR
URINALYSIS COLOR, POC: YELLOW
UROBILINOGEN, POC: NORMAL

## 2024-01-18 PROCEDURE — 81002 URINALYSIS NONAUTO W/O SCOPE: CPT | Performed by: OBSTETRICS & GYNECOLOGY

## 2024-01-18 PROCEDURE — 99395 PREV VISIT EST AGE 18-39: CPT | Performed by: OBSTETRICS & GYNECOLOGY

## 2024-01-18 RX ORDER — PHENTERMINE HYDROCHLORIDE 37.5 MG/1
18.75 TABLET ORAL DAILY
COMMUNITY
Start: 2023-12-28 | End: 2024-01-27

## 2024-01-19 LAB — DHEA-S SERPL-MCNC: 335 UG/DL (ref 57.3–279.2)

## 2024-01-21 LAB
C TRACH RRNA SPEC QL NAA+PROBE: NEGATIVE
N GONORRHOEA RRNA SPEC QL NAA+PROBE: NEGATIVE
SPECIMEN SOURCE: NORMAL
T VAGINALIS RRNA SPEC QL NAA+PROBE: NEGATIVE

## 2024-01-24 ENCOUNTER — TELEPHONE (OUTPATIENT)
Dept: OBGYN CLINIC | Age: 39
End: 2024-01-24

## 2024-01-27 LAB — TESTOST FREE SERPL-MCNC: 1.2 PG/ML (ref 0–4.2)

## 2024-03-06 ENCOUNTER — OFFICE VISIT (OUTPATIENT)
Dept: OBGYN CLINIC | Age: 39
End: 2024-03-06
Payer: OTHER GOVERNMENT

## 2024-03-06 VITALS — BODY MASS INDEX: 35.24 KG/M2 | WEIGHT: 211.5 LBS | HEIGHT: 65 IN

## 2024-03-06 DIAGNOSIS — Z11.3 SCREENING EXAMINATION FOR STD (SEXUALLY TRANSMITTED DISEASE): Primary | ICD-10-CM

## 2024-03-06 DIAGNOSIS — L68.0 HIRSUTISM: ICD-10-CM

## 2024-03-06 PROCEDURE — 99214 OFFICE O/P EST MOD 30 MIN: CPT | Performed by: OBSTETRICS & GYNECOLOGY

## 2024-03-06 NOTE — PROGRESS NOTES
Sheldon  is a 38 y.o. female, , Patient's last menstrual period was 11/10/2021 (exact date).,  who is seen for vaginal discharge curd-like and odorless.  Onset was several days ago. Symptoms have been unchanged since. Patient states she has a new partner. Requesting full std screening. Denies vaginal itching. Denies urination issues. Denies pelvic pain. Denies perineal odor.     Requesting blood work to check for PCOS.  She is concerned about difficulty losing weight along with hair growth Under chin and on the areolar area    HISTORY:  Sexual History:  has sex with males  Contraception:  status post hysterectomy  Current Outpatient Medications on File Prior to Visit   Medication Sig Dispense Refill    traZODone (DESYREL) 100 MG tablet Take 1 tablet by mouth nightly 90 tablet 1    dupilumab (DUPIXENT) 300 MG/2ML SOPN injection Inject 2 mLs into the skin every 14 days       No current facility-administered medications on file prior to visit.       ROS:  Review of Systems     Sheldon  has a past medical history of Contact dermatitis and eczema due to cause, Depression, and PONV (postoperative nausea and vomiting). .    Previous surgeries include  has a past surgical history that includes lap,tubal cautery; heent; gyn; Cholecystectomy; Hysterectomy (N/A, 2022); and Tonsillectomy ()..    Her current meds are   Current Outpatient Medications:     traZODone (DESYREL) 100 MG tablet, Take 1 tablet by mouth nightly, Disp: 90 tablet, Rfl: 1    dupilumab (DUPIXENT) 300 MG/2ML SOPN injection, Inject 2 mLs into the skin every 14 days, Disp: , Rfl:      Family history is significant for family history includes Diabetes in her maternal grandmother; Endometriosis in her maternal aunt and maternal grandmother; Hypertension in her father and maternal grandmother; No Known Problems in her mother..       PHYSICAL EXAM:  Height 1.638 m (5' 4.5\"), weight 95.9 kg (211 lb 8 oz), last menstrual period 11/10/2021, not

## 2024-03-07 ENCOUNTER — NURSE ONLY (OUTPATIENT)
Dept: OBGYN CLINIC | Age: 39
End: 2024-03-07

## 2024-03-07 DIAGNOSIS — Z11.3 SCREENING EXAMINATION FOR STD (SEXUALLY TRANSMITTED DISEASE): ICD-10-CM

## 2024-03-07 DIAGNOSIS — L68.0 HIRSUTISM: ICD-10-CM

## 2024-03-07 LAB
ESTRADIOL SERPL-MCNC: 45.78 PG/ML
FSH SERPL-ACNC: 7.8 MIU/ML
HCV AB SER QL: NONREACTIVE
HIV 1/2 RESULT COMMENT: NORMAL

## 2024-03-08 LAB — RPR SER QL: NONREACTIVE

## 2024-03-09 LAB — DHEA-S SERPL-MCNC: 284 UG/DL (ref 57.3–279.2)

## 2024-03-09 RX ORDER — METRONIDAZOLE 7.5 MG/G
1 GEL VAGINAL DAILY
Qty: 1 EACH | Refills: 0 | Status: SHIPPED | OUTPATIENT
Start: 2024-03-09 | End: 2024-03-14

## 2024-03-09 NOTE — RESULT ENCOUNTER NOTE
Labs are abnormal shows evidence of bacterial vaginosis.  Prescription for MetroGel was sent to the pharmacy.

## 2024-03-10 LAB
A VAGINAE DNA VAG QL NAA+PROBE: ABNORMAL SCORE
BVAB2 DNA VAG QL NAA+PROBE: ABNORMAL SCORE
C ALBICANS DNA VAG QL NAA+PROBE: NEGATIVE
C GLABRATA DNA VAG QL NAA+PROBE: NEGATIVE
C TRACH RRNA SPEC QL NAA+PROBE: NEGATIVE
N GONORRHOEA RRNA SPEC QL NAA+PROBE: NEGATIVE
SPECIMEN SOURCE: ABNORMAL

## 2024-04-18 RX ORDER — METRONIDAZOLE 7.5 MG/G
GEL VAGINAL
Qty: 70 G | OUTPATIENT
Start: 2024-04-18

## 2024-05-08 ENCOUNTER — APPOINTMENT (RX ONLY)
Dept: URBAN - METROPOLITAN AREA CLINIC 329 | Facility: CLINIC | Age: 39
Setting detail: DERMATOLOGY
End: 2024-05-08

## 2024-05-08 DIAGNOSIS — L20.89 OTHER ATOPIC DERMATITIS: ICD-10-CM | Status: WELL CONTROLLED

## 2024-05-08 DIAGNOSIS — L91.8 OTHER HYPERTROPHIC DISORDERS OF THE SKIN: ICD-10-CM

## 2024-05-08 PROCEDURE — ? PRESCRIPTION MEDICATION MANAGEMENT

## 2024-05-08 PROCEDURE — 99213 OFFICE O/P EST LOW 20 MIN: CPT

## 2024-05-08 PROCEDURE — ? COUNSELING

## 2024-05-08 PROCEDURE — ? PRESCRIPTION

## 2024-05-08 RX ORDER — DUPILUMAB 300 MG/2ML
INJECTION, SOLUTION SUBCUTANEOUS
Qty: 1 | Refills: 5 | Status: ERX

## 2024-05-08 ASSESSMENT — LOCATION DETAILED DESCRIPTION DERM
LOCATION DETAILED: LEFT ULNAR DORSAL HAND
LOCATION DETAILED: LEFT ANTERIOR PROXIMAL UPPER ARM
LOCATION DETAILED: RIGHT ANTERIOR SHOULDER
LOCATION DETAILED: LEFT RADIAL DORSAL HAND
LOCATION DETAILED: RIGHT DORSAL MIDDLE METACARPOPHALANGEAL JOINT

## 2024-05-08 ASSESSMENT — LOCATION SIMPLE DESCRIPTION DERM
LOCATION SIMPLE: LEFT HAND
LOCATION SIMPLE: LEFT UPPER ARM
LOCATION SIMPLE: RIGHT SHOULDER
LOCATION SIMPLE: RIGHT HAND

## 2024-05-08 ASSESSMENT — ITCH NUMERIC RATING SCALE: HOW SEVERE IS YOUR ITCHING?: 2

## 2024-05-08 ASSESSMENT — LOCATION ZONE DERM
LOCATION ZONE: ARM
LOCATION ZONE: HAND

## 2024-05-08 ASSESSMENT — SEVERITY ASSESSMENT 2020: SEVERITY 2020: MILD

## 2024-05-08 ASSESSMENT — BSA RASH: BSA RASH: 20

## 2024-05-08 NOTE — PROCEDURE: MIPS QUALITY
Detail Level: Detailed
Quality 130: Documentation Of Current Medications In The Medical Record: Current Medications Documented
yes/Right total hip replacement

## 2024-10-22 NOTE — PROGRESS NOTES
Sheldon  is a 39 y.o. female, , Patient's last menstrual period was 11/10/2021 (exact date)., who is seen for vaginal discharge clear, thin, and odorless.  Onset was several months ago. Symptoms have been unchanged since. Associated symptoms include: Sometime she has a little burning with urination. Denies fever/ chills. Denies pelvic pain. Denies itching. Denies frequency/ urgency with urination.    Patient states she does not think the Metrogel treated the BV completely because her symptoms have been present since March. 2024 Nuswab +BV. Treated with Metrogel.     HISTORY:  Sexual History:  has sex with males  Contraception:  status post hysterectomy  Current Outpatient Medications on File Prior to Visit   Medication Sig Dispense Refill    losartan (COZAAR) 50 MG tablet Take 1 tablet by mouth daily      phentermine (ADIPEX-P) 37.5 MG tablet Take 1 tablet by mouth every morning (before breakfast). 1 1/2 tablet daily  Max Daily Amount: 37.5 mg      traZODone (DESYREL) 100 MG tablet Take 1 tablet by mouth nightly 90 tablet 1    dupilumab (DUPIXENT) 300 MG/2ML SOPN injection Inject 2 mLs into the skin every 14 days       No current facility-administered medications on file prior to visit.       ROS:  Review of Systems     Sheldon  has a past medical history of Contact dermatitis and eczema due to cause, Depression, and PONV (postoperative nausea and vomiting). .    Previous surgeries include  has a past surgical history that includes lap,tubal cautery; heent; gyn; Cholecystectomy; Hysterectomy (N/A, 2022); and Tonsillectomy ()..    Her current meds are   Current Outpatient Medications:     losartan (COZAAR) 50 MG tablet, Take 1 tablet by mouth daily, Disp: , Rfl:     phentermine (ADIPEX-P) 37.5 MG tablet, Take 1 tablet by mouth every morning (before breakfast). 1 1/2 tablet daily  Max Daily Amount: 37.5 mg, Disp: , Rfl:     traZODone (DESYREL) 100 MG tablet, Take 1 tablet by mouth nightly, Disp:

## 2024-10-23 ENCOUNTER — OFFICE VISIT (OUTPATIENT)
Dept: OBGYN CLINIC | Age: 39
End: 2024-10-23
Payer: OTHER GOVERNMENT

## 2024-10-23 VITALS
WEIGHT: 214 LBS | DIASTOLIC BLOOD PRESSURE: 80 MMHG | HEIGHT: 65 IN | BODY MASS INDEX: 35.65 KG/M2 | SYSTOLIC BLOOD PRESSURE: 132 MMHG

## 2024-10-23 DIAGNOSIS — N89.8 VAGINAL DISCHARGE: ICD-10-CM

## 2024-10-23 DIAGNOSIS — N76.1 SUBACUTE VAGINITIS: Primary | ICD-10-CM

## 2024-10-23 PROCEDURE — 99213 OFFICE O/P EST LOW 20 MIN: CPT | Performed by: OBSTETRICS & GYNECOLOGY

## 2024-10-23 RX ORDER — PHENTERMINE HYDROCHLORIDE 37.5 MG/1
37.5 TABLET ORAL
COMMUNITY

## 2024-10-23 RX ORDER — LISINOPRIL 40 MG/1
40 TABLET ORAL DAILY
COMMUNITY
End: 2024-10-23

## 2024-10-23 RX ORDER — LOSARTAN POTASSIUM 50 MG/1
50 TABLET ORAL DAILY
COMMUNITY

## 2024-10-28 RX ORDER — METRONIDAZOLE 500 MG/1
500 TABLET ORAL 2 TIMES DAILY
Qty: 20 TABLET | Refills: 0 | Status: SHIPPED | OUTPATIENT
Start: 2024-10-28 | End: 2024-11-07

## 2024-10-29 LAB
A VAGINAE DNA VAG QL NAA+PROBE: ABNORMAL SCORE
BVAB2 DNA VAG QL NAA+PROBE: ABNORMAL SCORE
C ALBICANS DNA VAG QL NAA+PROBE: POSITIVE
C GLABRATA DNA VAG QL NAA+PROBE: NEGATIVE
MEGA1 DNA VAG QL NAA+PROBE: ABNORMAL SCORE
SPECIMEN SOURCE: ABNORMAL

## 2024-10-29 RX ORDER — FLUCONAZOLE 150 MG/1
150 TABLET ORAL
Qty: 2 TABLET | Refills: 0 | Status: SHIPPED | OUTPATIENT
Start: 2024-10-29 | End: 2024-11-04

## 2024-11-11 ENCOUNTER — APPOINTMENT (RX ONLY)
Dept: URBAN - METROPOLITAN AREA CLINIC 329 | Facility: CLINIC | Age: 39
Setting detail: DERMATOLOGY
End: 2024-11-11

## 2024-11-11 DIAGNOSIS — L20.89 OTHER ATOPIC DERMATITIS: ICD-10-CM | Status: WELL CONTROLLED

## 2024-11-11 PROCEDURE — ? PRESCRIPTION

## 2024-11-11 PROCEDURE — ? PRESCRIPTION MEDICATION MANAGEMENT

## 2024-11-11 PROCEDURE — ? DUPIXENT MONITORING

## 2024-11-11 PROCEDURE — ? COUNSELING

## 2024-11-11 PROCEDURE — 99214 OFFICE O/P EST MOD 30 MIN: CPT

## 2024-11-11 PROCEDURE — ? ADDITIONAL NOTES

## 2024-11-11 RX ORDER — DUPILUMAB 300 MG/2ML
INJECTION, SOLUTION SUBCUTANEOUS
Qty: 1 | Refills: 5 | Status: ERX

## 2024-11-11 ASSESSMENT — LOCATION ZONE DERM
LOCATION ZONE: ARM
LOCATION ZONE: HAND

## 2024-11-11 ASSESSMENT — LOCATION DETAILED DESCRIPTION DERM
LOCATION DETAILED: RIGHT DORSAL MIDDLE METACARPOPHALANGEAL JOINT
LOCATION DETAILED: LEFT ULNAR DORSAL HAND
LOCATION DETAILED: RIGHT ANTERIOR SHOULDER

## 2024-11-11 ASSESSMENT — SEVERITY ASSESSMENT 2020: SEVERITY 2020: ALMOST CLEAR

## 2024-11-11 ASSESSMENT — ITCH NUMERIC RATING SCALE: HOW SEVERE IS YOUR ITCHING?: 2

## 2024-11-11 ASSESSMENT — LOCATION SIMPLE DESCRIPTION DERM
LOCATION SIMPLE: LEFT HAND
LOCATION SIMPLE: RIGHT HAND
LOCATION SIMPLE: RIGHT SHOULDER

## 2024-11-11 ASSESSMENT — BSA ECZEMA: % BODY COVERED IN ECZEMA: 1

## 2024-11-11 NOTE — PROGRESS NOTES
The patient is a 39 y.o.  who is seen for vaginal discharge. Pt c/o thick, white discharge, with an odor. Occasional dysuria. Denies pelvic/abd/flank pain. Denies fevers. Pt was just seen by Dr. Palacios and was prescribed Flagy for BV and and diflucan. Pt took medications as prescribed but states symptoms never fully resolved. Pt states she's had multiple infections in the last year. Doesn't know if there's something she should do differently. . Pt denies new sexual partners. Pt stopped taking bubble baths and will just take a warm bath from time to time. Denies use of scented soaps/products vaginally. Pt has started eating more yogurt. Pt denies changes with soaps/detergents. Occasional burning while urinating.        HISTORY:      Patient's last menstrual period was 11/10/2021 (exact date).  Sexual History:  has sex with males  Contraception:  status post hysterectomy  Current Outpatient Medications on File Prior to Visit   Medication Sig Dispense Refill    losartan (COZAAR) 50 MG tablet Take 1 tablet by mouth daily      phentermine (ADIPEX-P) 37.5 MG tablet Take 1 tablet by mouth every morning (before breakfast). 1 1/2 tablet daily      traZODone (DESYREL) 100 MG tablet Take 1 tablet by mouth nightly 90 tablet 1    dupilumab (DUPIXENT) 300 MG/2ML SOPN injection Inject 2 mLs into the skin every 14 days       No current facility-administered medications on file prior to visit.       ROS:  Feeling well. No dyspnea or chest pain on exertion.  No abdominal pain, change in bowel habits, black or bloody stools.  No urinary tract symptoms. GYN ROS: see above.    PHYSICAL EXAM:  Blood pressure 114/60, height 1.638 m (5' 4.5\"), weight 95.1 kg (209 lb 11.2 oz), last menstrual period 11/10/2021, not currently breastfeeding.    The patient appears well, alert, oriented x 3, in no distress.  Lungs are clear. Heart RRR, no murmurs. Abdomen soft without tenderness, guarding, mass or organomegaly.  Pelvic: VULVA:
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
## 2024-11-12 ENCOUNTER — OFFICE VISIT (OUTPATIENT)
Dept: OBGYN CLINIC | Age: 39
End: 2024-11-12
Payer: OTHER GOVERNMENT

## 2024-11-12 VITALS
BODY MASS INDEX: 34.94 KG/M2 | WEIGHT: 209.7 LBS | DIASTOLIC BLOOD PRESSURE: 60 MMHG | SYSTOLIC BLOOD PRESSURE: 114 MMHG | HEIGHT: 65 IN

## 2024-11-12 DIAGNOSIS — B99.9 RECURRENT INFECTIONS: ICD-10-CM

## 2024-11-12 DIAGNOSIS — N89.8 VAGINAL DISCHARGE: ICD-10-CM

## 2024-11-12 DIAGNOSIS — B99.9 RECURRENT INFECTIONS: Primary | ICD-10-CM

## 2024-11-12 DIAGNOSIS — N89.8 VAGINAL ITCHING: ICD-10-CM

## 2024-11-12 DIAGNOSIS — N89.8 VAGINAL ODOR: ICD-10-CM

## 2024-11-12 DIAGNOSIS — R30.0 DYSURIA: ICD-10-CM

## 2024-11-12 LAB
BILIRUBIN, URINE, POC: NEGATIVE
BLOOD URINE, POC: NEGATIVE
GLUCOSE URINE, POC: NEGATIVE MG/DL
KETONES, URINE, POC: NEGATIVE MG/DL
LEUKOCYTE ESTERASE, URINE, POC: NEGATIVE
NITRITE, URINE, POC: NEGATIVE
PH, URINE, POC: 6 (ref 4.6–8)
PROTEIN,URINE, POC: NEGATIVE MG/DL
SPECIFIC GRAVITY, URINE, POC: 1.01 (ref 1–1.03)
URINALYSIS CLARITY, POC: NORMAL
URINALYSIS COLOR, POC: YELLOW
UROBILINOGEN, POC: NORMAL MG/DL

## 2024-11-12 PROCEDURE — 81003 URINALYSIS AUTO W/O SCOPE: CPT | Performed by: NURSE PRACTITIONER

## 2024-11-12 PROCEDURE — 99214 OFFICE O/P EST MOD 30 MIN: CPT | Performed by: NURSE PRACTITIONER

## 2024-11-14 LAB
BACTERIA SPEC CULT: ABNORMAL
BACTERIA SPEC CULT: ABNORMAL
SERVICE CMNT-IMP: ABNORMAL

## 2024-11-15 LAB
A VAGINAE DNA VAG QL NAA+PROBE: ABNORMAL SCORE
BVAB2 DNA VAG QL NAA+PROBE: ABNORMAL SCORE
C ALBICANS DNA VAG QL NAA+PROBE: NEGATIVE
C GLABRATA DNA VAG QL NAA+PROBE: NEGATIVE
C TRACH RRNA SPEC QL NAA+PROBE: NEGATIVE
CANDIDA KRUSEI: NEGATIVE
CANDIDA LUSITANIAE, NAA: NEGATIVE
CANDIDA PARAPSILOSIS/TROPICALIS: NEGATIVE
MEGA1 DNA VAG QL NAA+PROBE: ABNORMAL SCORE
N GONORRHOEA RRNA SPEC QL NAA+PROBE: NEGATIVE
T VAGINALIS RRNA SPEC QL NAA+PROBE: NEGATIVE

## 2024-11-15 RX ORDER — DOXYCYCLINE HYCLATE 100 MG
100 TABLET ORAL 2 TIMES DAILY
Qty: 28 TABLET | Refills: 0 | Status: SHIPPED | OUTPATIENT
Start: 2024-11-15 | End: 2024-11-29

## 2024-11-15 RX ORDER — METRONIDAZOLE 500 MG/1
500 TABLET ORAL 2 TIMES DAILY
Qty: 14 TABLET | Refills: 0 | Status: SHIPPED | OUTPATIENT
Start: 2024-11-15 | End: 2024-11-22

## 2024-11-17 LAB
M GENITALIUM DNA SPEC QL NAA+PROBE: NEGATIVE
M HOMINIS DNA SPEC QL NAA+PROBE: POSITIVE
SPECIMEN SOURCE: ABNORMAL
UREAPLASMA DNA SPEC QL NAA+PROBE: POSITIVE

## 2024-12-02 ENCOUNTER — TELEPHONE (OUTPATIENT)
Dept: OBGYN CLINIC | Age: 39
End: 2024-12-02

## 2024-12-02 NOTE — TELEPHONE ENCOUNTER
GYN patient called with complaints of continued vaginal symptoms after being seen 11/12/24.  Pt was dx with mycoplasma and BV.  Treated with Flagyl 500mg BID x 7d and Doxycycline 100mg BID x 14 days.  Has completed both prescriptions. Flagyl over a week ago.  Doxy on Friday.  Currently still having thick, white discharge, vaginal odor, and vaginal sore due to constant wiping. Sore clitoris.  Excruciating back pain on left side. Denies dysuria or dark colored urine.  No intercourse x 1 month.  Pt is requesting appt.  Appt scheduled for Wednesday at 2pm.  Pt advised to call back if sxs worsen or improve.  All questions answered. Pt v/u.

## 2024-12-04 ENCOUNTER — OFFICE VISIT (OUTPATIENT)
Dept: OBGYN CLINIC | Age: 39
End: 2024-12-04
Payer: OTHER GOVERNMENT

## 2024-12-04 VITALS
HEIGHT: 65 IN | WEIGHT: 210.5 LBS | DIASTOLIC BLOOD PRESSURE: 76 MMHG | BODY MASS INDEX: 35.07 KG/M2 | SYSTOLIC BLOOD PRESSURE: 122 MMHG

## 2024-12-04 DIAGNOSIS — B37.9 YEAST INFECTION: ICD-10-CM

## 2024-12-04 DIAGNOSIS — N89.8 VAGINAL ODOR: ICD-10-CM

## 2024-12-04 DIAGNOSIS — R10.2 VAGINAL PAIN: Primary | ICD-10-CM

## 2024-12-04 DIAGNOSIS — R35.0 URINARY FREQUENCY: ICD-10-CM

## 2024-12-04 DIAGNOSIS — M54.50 BILATERAL LOW BACK PAIN WITHOUT SCIATICA, UNSPECIFIED CHRONICITY: ICD-10-CM

## 2024-12-04 LAB
BILIRUBIN, URINE, POC: NEGATIVE
BLOOD URINE, POC: NEGATIVE
GLUCOSE URINE, POC: NEGATIVE
KETONES, URINE, POC: NEGATIVE
LEUKOCYTE ESTERASE, URINE, POC: NEGATIVE
NITRITE, URINE, POC: NEGATIVE
PH, URINE, POC: 7 (ref 4.6–8)
PROTEIN,URINE, POC: NEGATIVE
SPECIFIC GRAVITY, URINE, POC: 1.01 (ref 1–1.03)
URINALYSIS CLARITY, POC: CLEAR
URINALYSIS COLOR, POC: YELLOW
UROBILINOGEN, POC: NORMAL MG/DL

## 2024-12-04 PROCEDURE — 99214 OFFICE O/P EST MOD 30 MIN: CPT | Performed by: NURSE PRACTITIONER

## 2024-12-04 PROCEDURE — 81002 URINALYSIS NONAUTO W/O SCOPE: CPT | Performed by: NURSE PRACTITIONER

## 2024-12-04 RX ORDER — NYSTATIN AND TRIAMCINOLONE ACETONIDE 100000; 1 [USP'U]/G; MG/G
CREAM TOPICAL
Qty: 30 G | Refills: 1 | Status: SHIPPED | OUTPATIENT
Start: 2024-12-04

## 2024-12-04 RX ORDER — FLUCONAZOLE 150 MG/1
150 TABLET ORAL
Qty: 3 TABLET | Refills: 0 | Status: SHIPPED | OUTPATIENT
Start: 2024-12-04 | End: 2024-12-13

## 2024-12-04 NOTE — PROGRESS NOTES
The patient is a 39 y.o.  who is seen for persistent vaginal symptoms following treatment with Flagyl and Doxycycline. Pt dx with mycoplasma and BV following visit 2024. Vaginal sx include thick white discharge, vaginal odor, and vaginal soreness due to wiping.      HISTORY:      Patient's last menstrual period was 11/10/2021 (exact date).  Sexual History:  {Sexual Hx:5163}  Contraception:  {Contraception Methods:5051}  Current Outpatient Medications on File Prior to Visit   Medication Sig Dispense Refill    losartan (COZAAR) 50 MG tablet Take 1 tablet by mouth daily      phentermine (ADIPEX-P) 37.5 MG tablet Take 1 tablet by mouth every morning (before breakfast). 1 1/2 tablet daily      traZODone (DESYREL) 100 MG tablet Take 1 tablet by mouth nightly 90 tablet 1    dupilumab (DUPIXENT) 300 MG/2ML SOPN injection Inject 2 mLs into the skin every 14 days       No current facility-administered medications on file prior to visit.       ROS:  Feeling well. No dyspnea or chest pain on exertion.  No abdominal pain, change in bowel habits, black or bloody stools.  No urinary tract symptoms. GYN ROS: {gyn ros:585638}.    PHYSICAL EXAM:  Last menstrual period 11/10/2021, not currently breastfeeding.    The patient appears well, alert, oriented x 3, in no distress.  Lungs are clear. Heart RRR, no murmurs. Abdomen soft without tenderness, guarding, mass or organomegaly.  Pelvic: {pelvic exam:492767::\"normal external genitalia, vulva, vagina, cervix, uterus and adnexa\"}.    ASSESSMENT:  No diagnosis found.    PLAN:  {Gyn plan:51912}  No orders of the defined types were placed in this encounter.        Supervising physician is  ****.  *** minutes spent in chart review, exam, counseling and documentation.                 
with normal color and moderate thickl white discharge, no lesions.    ASSESSMENT:  Encounter Diagnoses   Name Primary?    Vaginal pain Yes    Vaginal odor     Yeast infection     Urinary frequency     Bilateral low back pain without sciatica, unspecified chronicity        PLAN:  All questions answered  UA WNL  Urine for culture  Exam c/w extensive yeast infection  Diflucan x3 doses   Will also send rx for mycolog cream for external irritation  Pat clean and dry when voiding  Vaseline if needed to protect from bodily fluids  JESE for mycoplasma 6 weeks  Call back if still no improvements/worsening     Diagnosis Orders   1. Vaginal pain  AMB POC URINALYSIS DIP STICK MANUAL W/O MICRO    fluconazole (DIFLUCAN) 150 MG tablet    nystatin-triamcinolone (MYCOLOG II) 402055-2.1 UNIT/GM-% cream      2. Vaginal odor  AMB POC URINALYSIS DIP STICK MANUAL W/O MICRO      3. Yeast infection  fluconazole (DIFLUCAN) 150 MG tablet    nystatin-triamcinolone (MYCOLOG II) 901449-0.1 UNIT/GM-% cream      4. Urinary frequency  Culture, Urine      5. Bilateral low back pain without sciatica, unspecified chronicity  Culture, Urine              Supervising physician is Dr. Palacios.  30 minutes spent in chart review, exam, counseling and documentation.

## 2024-12-16 ENCOUNTER — PATIENT MESSAGE (OUTPATIENT)
Dept: OBGYN CLINIC | Age: 39
End: 2024-12-16

## 2024-12-16 RX ORDER — TERCONAZOLE 4 MG/G
CREAM VAGINAL
Qty: 45 G | Refills: 0 | Status: SHIPPED | OUTPATIENT
Start: 2024-12-16 | End: 2024-12-23

## 2025-01-16 ENCOUNTER — OFFICE VISIT (OUTPATIENT)
Dept: OBGYN CLINIC | Age: 40
End: 2025-01-16
Payer: OTHER GOVERNMENT

## 2025-01-16 VITALS
DIASTOLIC BLOOD PRESSURE: 82 MMHG | WEIGHT: 206.6 LBS | BODY MASS INDEX: 34.42 KG/M2 | HEIGHT: 65 IN | SYSTOLIC BLOOD PRESSURE: 124 MMHG

## 2025-01-16 DIAGNOSIS — Z72.51 UNPROTECTED SEX: ICD-10-CM

## 2025-01-16 DIAGNOSIS — N76.1 SUBACUTE VAGINITIS: ICD-10-CM

## 2025-01-16 DIAGNOSIS — N89.8 VAGINAL ITCHING: ICD-10-CM

## 2025-01-16 DIAGNOSIS — Z22.39 CARRIER OF UREAPLASMA UREALYTICUM: Primary | ICD-10-CM

## 2025-01-16 PROCEDURE — 99213 OFFICE O/P EST LOW 20 MIN: CPT | Performed by: NURSE PRACTITIONER

## 2025-01-16 NOTE — PROGRESS NOTES
The patient is a 39 y.o.  who is seen for JESE. Tested positive for mycoplasma/ureaplasma 2024. States [facility] but notes was not treated as his urine came back normal. They have had sex 1x since tx. Notes overall she has not symptoms/complaints outside of itching. Denies odor. Denies discharge. Denies burning/frequency with urination. Denies pelvic/abd/flank pain. Denies feves. Denies new soaps/detergents.     HISTORY:      Patient's last menstrual period was 11/10/2021 (exact date).  Sexual History:  sexually active  Contraception:  hysterectomy  Current Outpatient Medications on File Prior to Visit   Medication Sig Dispense Refill    nystatin-triamcinolone (MYCOLOG II) 236458-7.1 UNIT/GM-% cream Apply topically 2 times daily. 30 g 1    losartan (COZAAR) 50 MG tablet Take 1 tablet by mouth daily      phentermine (ADIPEX-P) 37.5 MG tablet Take 1 tablet by mouth every morning (before breakfast). 1 1/2 tablet daily      traZODone (DESYREL) 100 MG tablet Take 1 tablet by mouth nightly 90 tablet 1    dupilumab (DUPIXENT) 300 MG/2ML SOPN injection Inject 2 mLs into the skin every 14 days       No current facility-administered medications on file prior to visit.       ROS:  Feeling well. No dyspnea or chest pain on exertion.  No abdominal pain, change in bowel habits, black or bloody stools.  No urinary tract symptoms. GYN ROS: see above.    PHYSICAL EXAM:  Blood pressure 124/82, height 1.638 m (5' 4.5\"), weight 93.7 kg (206 lb 9.6 oz), last menstrual period 11/10/2021, not currently breastfeeding.    The patient appears well, alert, oriented x 3, in no distress.  Lungs are clear. Heart RRR, no murmurs. Abdomen soft without tenderness, guarding, mass or organomegaly.  Pelvic: VULVA: normal appearing vulva with no masses, tenderness or lesions, VAGINA: normal appearing vagina with normal color and discharge, no lesions, CERVIX: normal appearing cervix without discharge or

## 2025-01-19 LAB
A VAGINAE DNA VAG QL NAA+PROBE: NORMAL SCORE
BVAB2 DNA VAG QL NAA+PROBE: NORMAL SCORE
C TRACH RRNA SPEC QL NAA+PROBE: NEGATIVE
M GENITALIUM DNA SPEC QL NAA+PROBE: NEGATIVE
MEGA1 DNA VAG QL NAA+PROBE: NORMAL SCORE
N GONORRHOEA RRNA SPEC QL NAA+PROBE: NEGATIVE
SPECIMEN SOURCE: NORMAL
T VAGINALIS RRNA SPEC QL NAA+PROBE: NEGATIVE

## 2025-01-20 LAB
A VAGINAE DNA VAG QL NAA+PROBE: ABNORMAL SCORE
BVAB2 DNA VAG QL NAA+PROBE: ABNORMAL SCORE
C ALBICANS DNA VAG QL NAA+PROBE: POSITIVE
C GLABRATA DNA VAG QL NAA+PROBE: NEGATIVE
C TRACH RRNA SPEC QL NAA+PROBE: NEGATIVE
M GENITALIUM DNA SPEC QL NAA+PROBE: NEGATIVE
M HOMINIS DNA SPEC QL NAA+PROBE: POSITIVE
MEGA1 DNA VAG QL NAA+PROBE: ABNORMAL SCORE
N GONORRHOEA RRNA SPEC QL NAA+PROBE: NEGATIVE
SPECIMEN SOURCE: ABNORMAL
T VAGINALIS RRNA SPEC QL NAA+PROBE: NEGATIVE
UREAPLASMA DNA SPEC QL NAA+PROBE: POSITIVE

## 2025-01-24 ENCOUNTER — PATIENT MESSAGE (OUTPATIENT)
Dept: OBGYN CLINIC | Age: 40
End: 2025-01-24

## 2025-01-24 RX ORDER — FLUCONAZOLE 150 MG/1
TABLET ORAL
Qty: 2 TABLET | Refills: 0 | Status: CANCELLED | OUTPATIENT
Start: 2025-01-24

## 2025-01-24 RX ORDER — DOXYCYCLINE HYCLATE 100 MG
100 TABLET ORAL 2 TIMES DAILY
Qty: 28 TABLET | Refills: 0 | Status: CANCELLED | OUTPATIENT
Start: 2025-01-24 | End: 2025-02-07

## 2025-01-24 RX ORDER — DOXYCYCLINE HYCLATE 100 MG
100 TABLET ORAL 2 TIMES DAILY
Qty: 28 TABLET | Refills: 0 | Status: SHIPPED | OUTPATIENT
Start: 2025-01-24 | End: 2025-02-07

## 2025-01-24 RX ORDER — FLUCONAZOLE 150 MG/1
TABLET ORAL
Qty: 2 TABLET | Refills: 0 | Status: SHIPPED | OUTPATIENT
Start: 2025-01-24

## 2025-01-24 NOTE — TELEPHONE ENCOUNTER
Per Mikaela, OK to re-send prescriptions since patient threw them away. Prescriptions pended and sent to Mikaela to sign

## 2025-03-11 ENCOUNTER — OFFICE VISIT (OUTPATIENT)
Dept: OBGYN CLINIC | Age: 40
End: 2025-03-11
Payer: OTHER GOVERNMENT

## 2025-03-11 VITALS
BODY MASS INDEX: 33.99 KG/M2 | DIASTOLIC BLOOD PRESSURE: 84 MMHG | SYSTOLIC BLOOD PRESSURE: 134 MMHG | HEIGHT: 65 IN | WEIGHT: 204 LBS

## 2025-03-11 DIAGNOSIS — N89.8 VAGINAL DISCHARGE: ICD-10-CM

## 2025-03-11 DIAGNOSIS — N76.1 SUBACUTE VAGINITIS: ICD-10-CM

## 2025-03-11 DIAGNOSIS — Z72.51 UNPROTECTED SEX: ICD-10-CM

## 2025-03-11 DIAGNOSIS — B99.9 RECURRENT INFECTIONS: ICD-10-CM

## 2025-03-11 DIAGNOSIS — Z22.39 CARRIER OF UREAPLASMA UREALYTICUM: Primary | ICD-10-CM

## 2025-03-11 PROCEDURE — 99213 OFFICE O/P EST LOW 20 MIN: CPT | Performed by: NURSE PRACTITIONER

## 2025-03-11 NOTE — PROGRESS NOTES
than 50% of this 20 minute visit counseling the patient.     Supervising provider is Dr. Maverick George

## 2025-03-13 LAB
M HOMINIS DNA SPEC QL NAA+PROBE: NEGATIVE
SPECIMEN SOURCE: ABNORMAL
UREAPLASMA DNA SPEC QL NAA+PROBE: POSITIVE

## 2025-03-17 ENCOUNTER — RESULTS FOLLOW-UP (OUTPATIENT)
Dept: OBGYN CLINIC | Age: 40
End: 2025-03-17

## 2025-03-17 DIAGNOSIS — Z22.39 CARRIER OF UREAPLASMA UREALYTICUM: Primary | ICD-10-CM

## 2025-03-17 DIAGNOSIS — N76.0 BV (BACTERIAL VAGINOSIS): ICD-10-CM

## 2025-03-17 DIAGNOSIS — B96.89 BV (BACTERIAL VAGINOSIS): ICD-10-CM

## 2025-03-17 LAB
M GENITALIUM DNA SPEC QL NAA+PROBE: ABNORMAL
M HOMINIS DNA SPEC QL NAA+PROBE: NEGATIVE
SPECIMEN SOURCE: ABNORMAL
UREAPLASMA DNA SPEC QL NAA+PROBE: POSITIVE

## 2025-03-17 RX ORDER — AZITHROMYCIN 500 MG/1
TABLET, FILM COATED ORAL
Qty: 2 TABLET | Refills: 0 | Status: SHIPPED | OUTPATIENT
Start: 2025-03-17

## 2025-03-20 ENCOUNTER — PATIENT MESSAGE (OUTPATIENT)
Dept: OBGYN CLINIC | Age: 40
End: 2025-03-20

## 2025-03-20 RX ORDER — TINIDAZOLE 500 MG/1
2 TABLET ORAL
Qty: 8 TABLET | Refills: 0 | Status: SHIPPED | OUTPATIENT
Start: 2025-03-20 | End: 2025-03-22

## 2025-05-21 ENCOUNTER — APPOINTMENT (OUTPATIENT)
Dept: URBAN - METROPOLITAN AREA CLINIC 329 | Facility: CLINIC | Age: 40
Setting detail: DERMATOLOGY
End: 2025-05-21

## 2025-05-21 DIAGNOSIS — L20.89 OTHER ATOPIC DERMATITIS: ICD-10-CM | Status: IMPROVED

## 2025-05-21 DIAGNOSIS — Z41.9 ENCOUNTER FOR PROCEDURE FOR PURPOSES OTHER THAN REMEDYING HEALTH STATE, UNSPECIFIED: ICD-10-CM

## 2025-05-21 PROCEDURE — ? PRESCRIPTION

## 2025-05-21 PROCEDURE — ? TREATMENT REGIMEN

## 2025-05-21 PROCEDURE — ? COUNSELING

## 2025-05-21 PROCEDURE — ? DUPIXENT MONITORING

## 2025-05-21 PROCEDURE — 99214 OFFICE O/P EST MOD 30 MIN: CPT

## 2025-05-21 PROCEDURE — ? PRESCRIPTION MEDICATION MANAGEMENT

## 2025-05-21 PROCEDURE — ? ADDITIONAL NOTES

## 2025-05-21 RX ORDER — DUPILUMAB 300 MG/2ML
INJECTION, SOLUTION SUBCUTANEOUS
Qty: 1 | Refills: 5 | Status: ERX

## 2025-05-21 ASSESSMENT — LOCATION DETAILED DESCRIPTION DERM
LOCATION DETAILED: LEFT ULNAR DORSAL HAND
LOCATION DETAILED: RIGHT ANTERIOR SHOULDER
LOCATION DETAILED: RIGHT DORSAL MIDDLE METACARPOPHALANGEAL JOINT

## 2025-05-21 ASSESSMENT — SEVERITY ASSESSMENT 2020: SEVERITY 2020: CLEAR

## 2025-05-21 ASSESSMENT — ITCH NUMERIC RATING SCALE: HOW SEVERE IS YOUR ITCHING?: 0

## 2025-05-21 ASSESSMENT — LOCATION SIMPLE DESCRIPTION DERM
LOCATION SIMPLE: RIGHT SHOULDER
LOCATION SIMPLE: LEFT HAND
LOCATION SIMPLE: RIGHT HAND

## 2025-05-21 ASSESSMENT — BSA ECZEMA: % BODY COVERED IN ECZEMA: 0

## 2025-05-21 ASSESSMENT — LOCATION ZONE DERM
LOCATION ZONE: ARM
LOCATION ZONE: HAND

## 2025-05-21 NOTE — PROCEDURE: ADDITIONAL NOTES
Additional Notes: Staying clear on Dupixent, no complaints
Render Risk Assessment In Note?: no
Detail Level: Simple

## (undated) DEVICE — SOLUTION IV 1000ML LAC RINGERS PH 6.5 INJ USP VIAFLX PLAS

## (undated) DEVICE — ROBOTIC HYSTERECTOMY: Brand: MEDLINE INDUSTRIES, INC.

## (undated) DEVICE — SUTURE MCRYL SZ 4-0 L27IN ABSRB UD L19MM PS-2 1/2 CIR PRIM Y426H

## (undated) DEVICE — APPLICATOR MEDICATED 26 CC SOLUTION HI LT ORNG CHLORAPREP

## (undated) DEVICE — COVER MPLR TIP CRV SCIS ACC DA VINCI

## (undated) DEVICE — GLOVE SURG SZ 8 L12IN FNGR THK79MIL GRN LTX FREE

## (undated) DEVICE — LOGICUT SCISSOR LENGTH 320MM: Brand: LOGI - LAPAROSCOPIC INSTRUMENT SYSTEM

## (undated) DEVICE — ARM DRAPE

## (undated) DEVICE — AIRSEAL 8 MM ACCESS PORT AND LOW PROFILE OBTURATOR WITH BLADELESS OPTICAL TIP, 120 MM LENGTH: Brand: AIRSEAL

## (undated) DEVICE — BLADELESS OBTURATOR: Brand: WECK VISTA

## (undated) DEVICE — CANNULA SEAL

## (undated) DEVICE — SOLUTION IRRIG 1000ML STRL H2O USP PLAS POUR BTL

## (undated) DEVICE — PAD PT POS 36 IN SURGYPAD DISP

## (undated) DEVICE — CANISTER, RIGID, 2000CC: Brand: MEDLINE INDUSTRIES, INC.

## (undated) DEVICE — SUTURE V-LOC 180 SZ 0 L12IN ABSRB GRN L37MM GS-21 1/2 CIR VLOCL0316

## (undated) DEVICE — GLOVE SURG SZ 75 CRM LTX FREE POLYISOPRENE POLYMER BEAD ANTI

## (undated) DEVICE — VCARE MEDIUM, UTERINE MANIPULATOR, VAGINAL-CERVICAL-AHLUWALIA'S-RETRACTOR-ELEVATOR: Brand: VCARE

## (undated) DEVICE — TOTAL TRAY, DB, 100% SILI FOLEY, 16FR 10: Brand: MEDLINE

## (undated) DEVICE — SYRINGE MED 10ML LUERLOCK TIP W/O SFTY DISP

## (undated) DEVICE — ADHESIVE SKIN CLSR 0.7ML TOP DERMBND ADV

## (undated) DEVICE — VESSEL SEALER EXTEND: Brand: ENDOWRIST

## (undated) DEVICE — SUTURE VCRL SZ 0 L36IN ABSRB UD L36MM CT-1 1/2 CIR J946H

## (undated) DEVICE — COLUMN DRAPE